# Patient Record
Sex: FEMALE | Race: WHITE | Employment: FULL TIME | URBAN - METROPOLITAN AREA
[De-identification: names, ages, dates, MRNs, and addresses within clinical notes are randomized per-mention and may not be internally consistent; named-entity substitution may affect disease eponyms.]

---

## 2017-01-24 ENCOUNTER — MED REC SCAN ONLY (OUTPATIENT)
Dept: INTERNAL MEDICINE CLINIC | Facility: CLINIC | Age: 32
End: 2017-01-24

## 2017-02-20 ENCOUNTER — PRIOR ORIGINAL RECORDS (OUTPATIENT)
Dept: OTHER | Age: 32
End: 2017-02-20

## 2017-05-03 ENCOUNTER — CHARTING TRANS (OUTPATIENT)
Dept: OTHER | Age: 32
End: 2017-05-03

## 2017-08-13 ENCOUNTER — HOSPITAL ENCOUNTER (OUTPATIENT)
Age: 32
Discharge: HOME | End: 2017-08-13
Payer: COMMERCIAL

## 2017-08-13 VITALS
WEIGHT: 190 LBS | HEART RATE: 93 BPM | DIASTOLIC BLOOD PRESSURE: 68 MMHG | SYSTOLIC BLOOD PRESSURE: 116 MMHG | RESPIRATION RATE: 16 BRPM | TEMPERATURE: 98 F | BODY MASS INDEX: 28 KG/M2 | OXYGEN SATURATION: 100 %

## 2017-08-13 DIAGNOSIS — J01.10 ACUTE FRONTAL SINUSITIS, RECURRENCE NOT SPECIFIED: Primary | ICD-10-CM

## 2017-08-13 DIAGNOSIS — J30.9 ALLERGIC RHINITIS, UNSPECIFIED CHRONICITY, UNSPECIFIED SEASONALITY, UNSPECIFIED TRIGGER: ICD-10-CM

## 2017-08-13 PROCEDURE — 99213 OFFICE O/P EST LOW 20 MIN: CPT

## 2017-08-13 PROCEDURE — 99204 OFFICE O/P NEW MOD 45 MIN: CPT

## 2017-08-13 RX ORDER — AMOXICILLIN AND CLAVULANATE POTASSIUM 875; 125 MG/1; MG/1
1 TABLET, FILM COATED ORAL 2 TIMES DAILY
Qty: 20 TABLET | Refills: 0 | Status: SHIPPED | OUTPATIENT
Start: 2017-08-13 | End: 2017-08-23

## 2017-08-13 NOTE — ED PROVIDER NOTES
Patient Seen in: [id number] [facility]    History   Patient presents with:  Cough/URI    Stated Complaint: Concetta Morales    HPI    28 yo female here with c/o sinus pain/pressure in tandem with post nasal drip and facial pressure and non-prod Smokeless tobacco: Never Used                      Alcohol use: No                The patient's medication list, past medical history and social history elements  as listed in today's nurse's notes are reviewed and agree.    The patient's family history and time. She has normal reflexes. Skin: Skin is warm and dry. Psychiatric: She has a normal mood and affect.  Her behavior is normal. Judgment and thought content normal.           ED Course       =======================================================

## 2017-08-13 NOTE — ED INITIAL ASSESSMENT (HPI)
Sinus pressure for 4 days, family member's have sinus infections,  No cough, +post nasal drip, no fevers.

## 2018-01-09 ENCOUNTER — LAB SERVICES (OUTPATIENT)
Dept: OTHER | Age: 33
End: 2018-01-09

## 2018-01-09 ENCOUNTER — CHARTING TRANS (OUTPATIENT)
Dept: OTHER | Age: 33
End: 2018-01-09

## 2018-01-11 LAB — PATH REPORT: NORMAL

## 2018-01-29 ENCOUNTER — TELEPHONE (OUTPATIENT)
Dept: INTERNAL MEDICINE CLINIC | Facility: CLINIC | Age: 33
End: 2018-01-29

## 2018-01-29 NOTE — TELEPHONE ENCOUNTER
Labs collected 1/17/18 from [facility] CBC, CMP and LIPID  To TB for review,    Pt overdue for [employer] -please help pt schedule

## 2018-01-30 NOTE — TELEPHONE ENCOUNTER
Called pt to let her know she is due for cpe and she will call us back in the summer to sched her cpe

## 2018-05-15 ENCOUNTER — HOSPITAL ENCOUNTER (OUTPATIENT)
Age: 33
Discharge: HOME | End: 2018-05-15
Attending: FAMILY MEDICINE
Payer: COMMERCIAL

## 2018-05-15 VITALS
BODY MASS INDEX: 31 KG/M2 | SYSTOLIC BLOOD PRESSURE: 114 MMHG | OXYGEN SATURATION: 98 % | WEIGHT: 210 LBS | DIASTOLIC BLOOD PRESSURE: 59 MMHG | HEART RATE: 75 BPM | RESPIRATION RATE: 16 BRPM | TEMPERATURE: 98 F

## 2018-05-15 DIAGNOSIS — K52.9 GASTROENTERITIS: Primary | ICD-10-CM

## 2018-05-15 DIAGNOSIS — R53.83 FATIGUE, UNSPECIFIED TYPE: ICD-10-CM

## 2018-05-15 PROCEDURE — 99213 OFFICE O/P EST LOW 20 MIN: CPT

## 2018-05-15 PROCEDURE — 99214 OFFICE O/P EST MOD 30 MIN: CPT

## 2018-05-15 RX ORDER — AZELASTINE HYDROCHLORIDE, FLUTICASONE PROPIONATE 137; 50 UG/1; UG/1
SPRAY, METERED NASAL
COMMUNITY
End: 2020-01-08 | Stop reason: ALTCHOICE

## 2018-05-15 RX ORDER — ONDANSETRON 8 MG/1
8 TABLET, ORALLY DISINTEGRATING ORAL EVERY 12 HOURS PRN
Qty: 10 TABLET | Refills: 0 | Status: SHIPPED | OUTPATIENT
Start: 2018-05-15 | End: 2018-05-25

## 2018-05-15 RX ORDER — LEVOCETIRIZINE DIHYDROCHLORIDE 5 MG/1
5 TABLET, FILM COATED ORAL EVERY EVENING
COMMUNITY
End: 2018-08-20

## 2018-05-15 NOTE — ED PROVIDER NOTES
Patient Seen in: [id number] [facility]    History   No chief complaint on file. Stated Complaint: VOMITING/FATIGUE/HEADACHE    HPI    75-year-old female presents to the immediate care today with chief complaints of nausea and vomiting.   Her VOMITING/FATIGUE/HEADACHE  Other systems are as noted in HPI. Constitutional and vital signs reviewed. All other systems reviewed and negative except as noted above.     Physical Exam   ED Triage Vitals  BP: 114/59 [05/15/18 1211]  Pulse: 75 [05/15/18 directed        Disposition and Plan     Clinical Impression:  Gastroenteritis  (primary encounter diagnosis)  Fatigue, unspecified type    Disposition:  Discharge  5/15/2018 12:31 pm    Follow-up:  MD Ana Luisa Bernardo

## 2018-05-15 NOTE — ED INITIAL ASSESSMENT (HPI)
Patient states she was vomiting Friday into Saturday. Emesis x 4-5. Denies diarrhea. Headache since Saturday. Vomiting resolved and is able to tolerate food and liquids.

## 2018-08-20 PROCEDURE — 88175 CYTOPATH C/V AUTO FLUID REDO: CPT | Performed by: OBSTETRICS & GYNECOLOGY

## 2018-08-20 PROCEDURE — 87624 HPV HI-RISK TYP POOLED RSLT: CPT | Performed by: OBSTETRICS & GYNECOLOGY

## 2019-01-03 ENCOUNTER — MYAURORA ACCOUNT LINK (OUTPATIENT)
Dept: OTHER | Age: 34
End: 2019-01-03

## 2019-01-03 ENCOUNTER — PRIOR ORIGINAL RECORDS (OUTPATIENT)
Dept: OTHER | Age: 34
End: 2019-01-03

## 2019-01-14 ENCOUNTER — PATIENT MESSAGE (OUTPATIENT)
Dept: INTERNAL MEDICINE CLINIC | Facility: CLINIC | Age: 34
End: 2019-01-14

## 2019-01-14 ENCOUNTER — OFFICE VISIT (OUTPATIENT)
Dept: INTERNAL MEDICINE CLINIC | Facility: CLINIC | Age: 34
End: 2019-01-14
Payer: COMMERCIAL

## 2019-01-14 VITALS
TEMPERATURE: 98 F | RESPIRATION RATE: 14 BRPM | DIASTOLIC BLOOD PRESSURE: 74 MMHG | HEIGHT: 69 IN | SYSTOLIC BLOOD PRESSURE: 116 MMHG | HEART RATE: 72 BPM | BODY MASS INDEX: 33.77 KG/M2 | WEIGHT: 228 LBS

## 2019-01-14 DIAGNOSIS — F41.9 ANXIETY: ICD-10-CM

## 2019-01-14 DIAGNOSIS — G44.86 CERVICOGENIC HEADACHE: Primary | ICD-10-CM

## 2019-01-14 PROCEDURE — 99213 OFFICE O/P EST LOW 20 MIN: CPT | Performed by: INTERNAL MEDICINE

## 2019-01-14 RX ORDER — CYCLOBENZAPRINE HCL 5 MG
5 TABLET ORAL NIGHTLY PRN
Qty: 30 TABLET | Refills: 1 | Status: SHIPPED | OUTPATIENT
Start: 2019-01-14 | End: 2019-05-31 | Stop reason: ALTCHOICE

## 2019-01-14 RX ORDER — ESCITALOPRAM OXALATE 20 MG/1
20 TABLET ORAL DAILY
Qty: 90 TABLET | Refills: 1 | Status: SHIPPED | OUTPATIENT
Start: 2019-01-14 | End: 2019-05-31 | Stop reason: ALTCHOICE

## 2019-01-14 NOTE — PROGRESS NOTES
Kyung Morgan is a 26year old female. Patient presents with:  Headache: Pt is having frequent and worsening headaches for the past 6 months. LB-rm 4      HPI:     C/o headaches in the back of her head for 6 months. Heat and nsaids help.   Denies any co depression 6/28/2013   • BACK PAIN    • Bronchitis    • Hirsutism     FACE   • History of blood transfusion     postpartum 6/2013 d/t uterine atony   • Hypercholesterolemia    • Personal history of malignant melanoma of skin 4-27-15    MM - Right Upper Arm types were placed in this encounter.       Meds & Refills for this Visit:  Requested Prescriptions     Signed Prescriptions Disp Refills   • Cyclobenzaprine HCl 5 MG Oral Tab 30 tablet 1     Sig: Take 1 tablet (5 mg total) by mouth nightly as needed for Mus

## 2019-01-15 ENCOUNTER — TELEPHONE (OUTPATIENT)
Dept: INTERNAL MEDICINE CLINIC | Facility: CLINIC | Age: 34
End: 2019-01-15

## 2019-01-15 DIAGNOSIS — Z13.220 SCREENING FOR LIPID DISORDERS: ICD-10-CM

## 2019-01-15 DIAGNOSIS — Z13.228 SCREENING FOR ENDOCRINE, METABOLIC AND IMMUNITY DISORDER: ICD-10-CM

## 2019-01-15 DIAGNOSIS — Z00.00 ROUTINE GENERAL MEDICAL EXAMINATION AT A HEALTH CARE FACILITY: Primary | ICD-10-CM

## 2019-01-15 DIAGNOSIS — Z13.0 SCREENING FOR DISORDER OF BLOOD AND BLOOD-FORMING ORGANS: ICD-10-CM

## 2019-01-15 DIAGNOSIS — Z13.0 SCREENING FOR ENDOCRINE, METABOLIC AND IMMUNITY DISORDER: ICD-10-CM

## 2019-01-15 DIAGNOSIS — Z13.29 SCREENING FOR ENDOCRINE, METABOLIC AND IMMUNITY DISORDER: ICD-10-CM

## 2019-01-15 DIAGNOSIS — Z13.29 SCREENING FOR THYROID DISORDER: ICD-10-CM

## 2019-01-15 NOTE — TELEPHONE ENCOUNTER
Future Appointments   Date Time Provider Beckie Singh   2/11/2019  3:00 PM Cat Woods [facility] [address]   7/1/2019  9:00 AM Jannette Felder MD [facility]     Orders to quest-Pt aware to fast-no call back required

## 2019-01-15 NOTE — TELEPHONE ENCOUNTER
Matthew Duarte MD at 1/15/2019 12:42 PM     Status: Signed      I cannot put it in as insurance would need documentation from me about her back, her back exam etc

## 2019-01-15 NOTE — TELEPHONE ENCOUNTER
January 14, 2019   Keron Allen   to Enoch Sylvester MD           6:25 PM   Hi Dr. Sharif Julio-   I know I had mentioned that I made an appointment with Dr. Sanket Griggs regarding continued pain/ discomfort in my lower back.  I meant to ask if you were able to pu

## 2019-01-15 NOTE — TELEPHONE ENCOUNTER
Patient notified TB would not be able to order, insurance would need documentation on her back along with an exam.  Pt verbalizes understanding.

## 2019-01-22 ENCOUNTER — TELEPHONE (OUTPATIENT)
Dept: INTERNAL MEDICINE CLINIC | Facility: CLINIC | Age: 34
End: 2019-01-22

## 2019-02-02 ENCOUNTER — TELEPHONE (OUTPATIENT)
Dept: INTERNAL MEDICINE CLINIC | Facility: CLINIC | Age: 34
End: 2019-02-02

## 2019-02-02 NOTE — TELEPHONE ENCOUNTER
Wellness labs have been given to AS to review as due to elevated ALT- 171 and AST 79.  Per AS ok to wait placed in TB bin to further review

## 2019-02-04 NOTE — TELEPHONE ENCOUNTER
[phone]  Lm for pt (Kirsten Mendez per HIPAA) to inform, per TB, elevated LFT - AST 79 and   LDL elevated at 191  Advised will need 30 min f/u for elevated LFTs, high cholesterol and headaches within 2-3 weeks ideally.  To call back at the office to schedul

## 2019-02-04 NOTE — TELEPHONE ENCOUNTER
Elevated LFT - AST 79 and   LDL elevated at 191  Needs 30 min f/u for elevated LFTs, high cholesterol and headaches.   Within 2-3 weeks ideally

## 2019-02-09 ENCOUNTER — MED REC SCAN ONLY (OUTPATIENT)
Dept: INTERNAL MEDICINE CLINIC | Facility: CLINIC | Age: 34
End: 2019-02-09

## 2019-02-21 RX ORDER — AZELASTINE HYDROCHLORIDE AND FLUTICASONE PROPIONATE 137; 50 UG/1; UG/1
SPRAY, METERED NASAL
COMMUNITY

## 2019-02-21 RX ORDER — VILAZODONE HYDROCHLORIDE 40 MG/1
TABLET ORAL
COMMUNITY

## 2019-02-28 ENCOUNTER — HOSPITAL ENCOUNTER (OUTPATIENT)
Dept: ULTRASOUND IMAGING | Age: 34
Discharge: HOME | End: 2019-02-28
Attending: INTERNAL MEDICINE
Payer: COMMERCIAL

## 2019-02-28 VITALS
SYSTOLIC BLOOD PRESSURE: 118 MMHG | WEIGHT: 225 LBS | BODY MASS INDEX: 33.33 KG/M2 | HEART RATE: 80 BPM | RESPIRATION RATE: 16 BRPM | HEIGHT: 69 IN | DIASTOLIC BLOOD PRESSURE: 70 MMHG

## 2019-02-28 DIAGNOSIS — R79.89 ELEVATED LFTS: ICD-10-CM

## 2019-02-28 DIAGNOSIS — R79.89 ELEVATED FERRITIN: ICD-10-CM

## 2019-02-28 PROCEDURE — 76700 US EXAM ABDOM COMPLETE: CPT | Performed by: INTERNAL MEDICINE

## 2019-03-01 VITALS
HEIGHT: 69 IN | HEART RATE: 64 BPM | RESPIRATION RATE: 16 BRPM | DIASTOLIC BLOOD PRESSURE: 62 MMHG | WEIGHT: 175 LBS | BODY MASS INDEX: 25.92 KG/M2 | SYSTOLIC BLOOD PRESSURE: 102 MMHG

## 2019-04-04 ENCOUNTER — TELEPHONE (OUTPATIENT)
Dept: INTERNAL MEDICINE CLINIC | Facility: CLINIC | Age: 34
End: 2019-04-04

## 2019-04-04 NOTE — TELEPHONE ENCOUNTER
Fax received from [facility] physical therapy with Discharge Summary requesting signature , paperwork placed in TB's bin to be completed

## 2019-05-31 ENCOUNTER — OFFICE VISIT (OUTPATIENT)
Dept: INTERNAL MEDICINE CLINIC | Facility: CLINIC | Age: 34
End: 2019-05-31
Payer: COMMERCIAL

## 2019-05-31 VITALS
RESPIRATION RATE: 16 BRPM | HEART RATE: 92 BPM | TEMPERATURE: 99 F | WEIGHT: 231 LBS | SYSTOLIC BLOOD PRESSURE: 96 MMHG | BODY MASS INDEX: 34 KG/M2 | DIASTOLIC BLOOD PRESSURE: 60 MMHG

## 2019-05-31 DIAGNOSIS — R05.9 COUGH: ICD-10-CM

## 2019-05-31 DIAGNOSIS — J06.9 URI, ACUTE: Primary | ICD-10-CM

## 2019-05-31 PROCEDURE — 99213 OFFICE O/P EST LOW 20 MIN: CPT | Performed by: INTERNAL MEDICINE

## 2019-05-31 RX ORDER — CODEINE PHOSPHATE AND GUAIFENESIN 10; 100 MG/5ML; MG/5ML
10 SOLUTION ORAL NIGHTLY PRN
Qty: 180 ML | Refills: 0 | Status: SHIPPED | OUTPATIENT
Start: 2019-05-31 | End: 2019-06-10

## 2019-05-31 RX ORDER — ALBUTEROL SULFATE 90 UG/1
2 AEROSOL, METERED RESPIRATORY (INHALATION) EVERY 4 HOURS PRN
Qty: 1 INHALER | Refills: 1 | Status: SHIPPED | OUTPATIENT
Start: 2019-05-31 | End: 2020-01-08 | Stop reason: ALTCHOICE

## 2019-05-31 RX ORDER — AMOXICILLIN AND CLAVULANATE POTASSIUM 875; 125 MG/1; MG/1
1 TABLET, FILM COATED ORAL 2 TIMES DAILY
Qty: 20 TABLET | Refills: 0 | Status: SHIPPED | OUTPATIENT
Start: 2019-05-31 | End: 2019-06-10

## 2019-05-31 NOTE — PROGRESS NOTES
Alyx Real is a 37year old female. Patient presents with:  Cough: SN Rm 3; x 1 week, cough, sinus drainage, denied fevers      HPI:     C/o 1+ week of cough, congestion, sinus drainage, fatigue.  +sick contact (co worker with pneumonia)  Unable to s malignant melanoma of skin 4-27-15    MM - Right Upper Arm - WLE done by FEDERICO Grimes MD.   • Post partum depression    • Postpartum depression, postpartum condition 6/28/2013   • Sinusitis       Social History:  Social History    Tobacco Use      Smoking st Prescriptions Disp Refills   • Amoxicillin-Pot Clavulanate 875-125 MG Oral Tab 20 tablet 0     Sig: Take 1 tablet by mouth 2 (two) times daily for 10 days.    • guaiFENesin-codeine (CHERATUSSIN AC) 100-10 MG/5ML Oral Solution 180 mL 0     Sig: Take 10 mL by

## 2019-06-11 ENCOUNTER — APPOINTMENT (OUTPATIENT)
Dept: DERMATOLOGY | Age: 34
End: 2019-06-11

## 2019-06-18 ENCOUNTER — OFFICE VISIT (OUTPATIENT)
Dept: DERMATOLOGY | Age: 34
End: 2019-06-18

## 2019-06-18 DIAGNOSIS — B35.3 TINEA PEDIS, UNSPECIFIED LATERALITY: ICD-10-CM

## 2019-06-18 DIAGNOSIS — Z85.820 HISTORY OF MALIGNANT MELANOMA OF SKIN: ICD-10-CM

## 2019-06-18 DIAGNOSIS — D23.9 BENIGN NEOPLASM OF SKIN, UNSPECIFIED LOCATION: Primary | ICD-10-CM

## 2019-06-18 DIAGNOSIS — Z12.83 SCREENING EXAM FOR SKIN CANCER: ICD-10-CM

## 2019-06-18 PROCEDURE — 99214 OFFICE O/P EST MOD 30 MIN: CPT | Performed by: DERMATOLOGY

## 2019-06-18 RX ORDER — CLOTRIMAZOLE AND BETAMETHASONE DIPROPIONATE 10; .64 MG/G; MG/G
CREAM TOPICAL
Qty: 45 G | Refills: 1 | Status: SHIPPED | OUTPATIENT
Start: 2019-06-18 | End: 2020-06-18 | Stop reason: SDUPTHER

## 2020-01-08 ENCOUNTER — OFFICE VISIT (OUTPATIENT)
Dept: INTERNAL MEDICINE CLINIC | Facility: CLINIC | Age: 35
End: 2020-01-08
Payer: COMMERCIAL

## 2020-01-08 VITALS
RESPIRATION RATE: 18 BRPM | SYSTOLIC BLOOD PRESSURE: 116 MMHG | TEMPERATURE: 97 F | HEIGHT: 69.5 IN | DIASTOLIC BLOOD PRESSURE: 72 MMHG | HEART RATE: 77 BPM | WEIGHT: 242 LBS | OXYGEN SATURATION: 99 % | BODY MASS INDEX: 35.04 KG/M2

## 2020-01-08 DIAGNOSIS — J06.9 URI, ACUTE: Primary | ICD-10-CM

## 2020-01-08 PROCEDURE — 99213 OFFICE O/P EST LOW 20 MIN: CPT | Performed by: PHYSICIAN ASSISTANT

## 2020-01-08 RX ORDER — AZITHROMYCIN 250 MG/1
TABLET, FILM COATED ORAL
Qty: 6 TABLET | Refills: 0 | Status: SHIPPED | OUTPATIENT
Start: 2020-01-08 | End: 2020-01-16 | Stop reason: ALTCHOICE

## 2020-01-08 RX ORDER — ALBUTEROL SULFATE 90 UG/1
2 AEROSOL, METERED RESPIRATORY (INHALATION) EVERY 4 HOURS PRN
Qty: 1 INHALER | Refills: 0 | Status: SHIPPED | OUTPATIENT
Start: 2020-01-08 | End: 2020-06-10 | Stop reason: ALTCHOICE

## 2020-01-08 NOTE — PROGRESS NOTES
HPI:   Anand Méndez is a 37year old female who presents for upper respiratory symptoms for  7  days (7 days of cough but more like a month of nasal congestion).     Patient reports sore throat, congestion, cough with yellow colored sputum, cough is rick Maternal Grandmother         Colon   • Heart Disease Paternal Grandfather    • Cancer Paternal Grandmother         breast cancer    • Breast Cancer Paternal Grandmother 39        dx age 39      Social History    Tobacco Use      Smoking status: Never Smoke physical.  There are no Patient Instructions on file for this visit. All questions were answered and the patient understands the plan.

## 2020-01-16 ENCOUNTER — OFFICE VISIT (OUTPATIENT)
Dept: INTERNAL MEDICINE CLINIC | Facility: CLINIC | Age: 35
End: 2020-01-16
Payer: COMMERCIAL

## 2020-01-16 VITALS
WEIGHT: 240 LBS | SYSTOLIC BLOOD PRESSURE: 100 MMHG | BODY MASS INDEX: 34.75 KG/M2 | HEIGHT: 69.5 IN | TEMPERATURE: 98 F | HEART RATE: 96 BPM | RESPIRATION RATE: 16 BRPM | OXYGEN SATURATION: 97 % | DIASTOLIC BLOOD PRESSURE: 76 MMHG

## 2020-01-16 DIAGNOSIS — J06.9 URI, ACUTE: Primary | ICD-10-CM

## 2020-01-16 PROCEDURE — 99213 OFFICE O/P EST LOW 20 MIN: CPT | Performed by: INTERNAL MEDICINE

## 2020-01-16 RX ORDER — METHYLPREDNISOLONE 4 MG/1
TABLET ORAL
Qty: 1 KIT | Refills: 0 | Status: SHIPPED | OUTPATIENT
Start: 2020-01-16 | End: 2020-02-06 | Stop reason: ALTCHOICE

## 2020-01-16 RX ORDER — FLUTICASONE PROPIONATE 50 MCG
1 SPRAY, SUSPENSION (ML) NASAL 2 TIMES DAILY
COMMUNITY
Start: 2020-01-08 | End: 2020-06-10 | Stop reason: ALTCHOICE

## 2020-01-16 RX ORDER — AMOXICILLIN AND CLAVULANATE POTASSIUM 875; 125 MG/1; MG/1
1 TABLET, FILM COATED ORAL 2 TIMES DAILY
Qty: 20 TABLET | Refills: 0 | Status: SHIPPED | OUTPATIENT
Start: 2020-01-16 | End: 2020-01-26

## 2020-01-16 NOTE — PROGRESS NOTES
Aislinn Allen  4/26/1985    Patient presents with:  Cough: since 1/8, worsened cough, denies SOB/wheezing, productive, nasal congestion, denies fevers/chills      SUBJECTIVE   Aislinn Allen is a 37year old female who presents with a cough.     The pa Post partum depression    • Postpartum depression, postpartum condition 6/28/2013   • Sinusitis       Patient Active Problem List:     Displacement of lumbar intervertebral disc without myelopathy     DDD (degenerative disc disease), lumbar     Lumbago syn gallops. Pulmonary/Chest: Effort normal and breath sounds normal. No respiratory distress. No rhonchi, crackles, or wheezes. Abdominal: Soft. Bowel sounds are normal. Non tender, no masses, no organomegaly or hernias.   Musculoskeletal: No edema  Lympha

## 2020-01-29 LAB
AMB EXT CHOL/HDL RATIO: 8.1
AMB EXT CHOLESTEROL, TOTAL: 292 MG/DL
AMB EXT CREATININE: 0.76 MG/DL
AMB EXT GLUCOSE: 111 MG/DL
AMB EXT HDL CHOLESTEROL: 36 MG/DL
AMB EXT HEMATOCRIT: 39.6
AMB EXT HEMOGLOBIN: 13.1
AMB EXT HGBA1C: 6 %
AMB EXT LDL CHOLESTEROL, DIRECT: 200 MG/DL
AMB EXT MCV: 94
AMB EXT PLATELETS: 319
AMB EXT TRIGLYCERIDES: 282 MG/DL
AMB EXT WBC: 6.8 X10(3)UL

## 2020-01-31 ENCOUNTER — TELEPHONE (OUTPATIENT)
Dept: INTERNAL MEDICINE CLINIC | Facility: CLINIC | Age: 35
End: 2020-01-31

## 2020-01-31 NOTE — TELEPHONE ENCOUNTER
[phone]   for pt ([id number] Chrissy Vazquez per HIPAA) to inform, per TB, will need OV to further discuss options regarding high cholesterol panel and BG. To call back at the office to schedule or if any further questions. Original copy of results to scan.   Copy in upcom

## 2020-01-31 NOTE — TELEPHONE ENCOUNTER
Very high cholesterol: total 292, , HDL 36 and !   BG high at 111 with hgba1c of 6  Needs OV to discuss options

## 2020-02-06 ENCOUNTER — OFFICE VISIT (OUTPATIENT)
Dept: INTERNAL MEDICINE CLINIC | Facility: CLINIC | Age: 35
End: 2020-02-06
Payer: COMMERCIAL

## 2020-02-06 ENCOUNTER — OFFICE VISIT (OUTPATIENT)
Dept: DERMATOLOGY | Age: 35
End: 2020-02-06

## 2020-02-06 VITALS
DIASTOLIC BLOOD PRESSURE: 68 MMHG | HEART RATE: 76 BPM | HEIGHT: 69.29 IN | TEMPERATURE: 99 F | WEIGHT: 241 LBS | RESPIRATION RATE: 16 BRPM | SYSTOLIC BLOOD PRESSURE: 98 MMHG | BODY MASS INDEX: 35.29 KG/M2

## 2020-02-06 DIAGNOSIS — F41.9 ANXIETY: ICD-10-CM

## 2020-02-06 DIAGNOSIS — M51.36 DDD (DEGENERATIVE DISC DISEASE), LUMBAR: ICD-10-CM

## 2020-02-06 DIAGNOSIS — L57.0 ACTINIC KERATOSES: Primary | ICD-10-CM

## 2020-02-06 DIAGNOSIS — E66.9 CLASS 2 OBESITY: ICD-10-CM

## 2020-02-06 DIAGNOSIS — Z00.00 ANNUAL PHYSICAL EXAM: Primary | ICD-10-CM

## 2020-02-06 PROCEDURE — 99395 PREV VISIT EST AGE 18-39: CPT | Performed by: INTERNAL MEDICINE

## 2020-02-06 PROCEDURE — 99213 OFFICE O/P EST LOW 20 MIN: CPT | Performed by: DERMATOLOGY

## 2020-02-06 PROCEDURE — 17000 DESTRUCT PREMALG LESION: CPT | Performed by: DERMATOLOGY

## 2020-02-06 RX ORDER — ESCITALOPRAM OXALATE 10 MG/1
10 TABLET ORAL NIGHTLY
Qty: 90 TABLET | Refills: 3 | Status: SHIPPED | OUTPATIENT
Start: 2020-02-06 | End: 2021-01-18

## 2020-02-06 NOTE — PROGRESS NOTES
Sky Allen  4/26/1985    Patient presents with:  Wellness Visit      HPI:   Minna Pope is a 37year old female who presents for an annual physical examination.     The patient has been in her usual state of health and denies any acute complaints Personal history of malignant melanoma of skin     Malignant melanoma ([facility] [address]     Mild hyperlipidemia     Anxiety     Past Surgical History:   Procedure Laterality Date   • BACK SURGERY  5/26/10    lumbar fusion (L4-L5 XLIF)   • BACK SURGERY  5/26/10    lumb Eva Dorado is a 37year old female who presents for an annual physical examination. Screening and prevention:  Screening for cervical cancer: UTD. Follows with gynecology service.   Influenza immunization: 9/2019  Tetanus immunization: 2013    An

## 2020-04-01 ENCOUNTER — E-VISIT (OUTPATIENT)
Dept: FAMILY MEDICINE CLINIC | Facility: CLINIC | Age: 35
End: 2020-04-01

## 2020-04-01 DIAGNOSIS — J06.9 VIRAL URI: Primary | ICD-10-CM

## 2020-04-02 NOTE — PROGRESS NOTES
Viral sx vs allergies for 6 days. Supportive care advised, if sx continue the next 3-5 days patient advised to contact pcp office. She will not be charged for e-visit.

## 2020-04-04 ENCOUNTER — E-VISIT (OUTPATIENT)
Dept: FAMILY MEDICINE CLINIC | Facility: CLINIC | Age: 35
End: 2020-04-04

## 2020-04-04 DIAGNOSIS — R39.9 URINARY SYMPTOM OR SIGN: Primary | ICD-10-CM

## 2020-04-05 RX ORDER — NITROFURANTOIN 25; 75 MG/1; MG/1
100 CAPSULE ORAL 2 TIMES DAILY
Qty: 14 CAPSULE | Refills: 0 | Status: SHIPPED | OUTPATIENT
Start: 2020-04-05 | End: 2020-04-12

## 2020-04-05 NOTE — PROGRESS NOTES
Riya Young is a 37year old female. HPI:   See answers to questions above.      Current Outpatient Medications   Medication Sig Dispense Refill   • Nitrofurantoin Monohyd Macro (MACROBID) 100 MG Oral Cap Take 1 capsule (100 mg total) by mouth 2 (two) Grandmother         breast cancer    • Breast Cancer Paternal Grandmother 39        dx age 39      Social History:  Social History    Tobacco Use      Smoking status: Never Smoker      Smokeless tobacco: Never Used    Alcohol use: No      Alcohol/week: 0.0

## 2020-06-02 ENCOUNTER — TELEPHONE (OUTPATIENT)
Dept: INTERNAL MEDICINE CLINIC | Facility: CLINIC | Age: 35
End: 2020-06-02

## 2020-06-10 ENCOUNTER — OFFICE VISIT (OUTPATIENT)
Dept: INTERNAL MEDICINE CLINIC | Facility: CLINIC | Age: 35
End: 2020-06-10
Payer: COMMERCIAL

## 2020-06-10 VITALS
HEIGHT: 69.09 IN | WEIGHT: 230 LBS | HEART RATE: 76 BPM | BODY MASS INDEX: 34.07 KG/M2 | RESPIRATION RATE: 16 BRPM | TEMPERATURE: 98 F | SYSTOLIC BLOOD PRESSURE: 96 MMHG | DIASTOLIC BLOOD PRESSURE: 66 MMHG

## 2020-06-10 DIAGNOSIS — Z01.818 PREOP EXAMINATION: Primary | ICD-10-CM

## 2020-06-10 DIAGNOSIS — M72.2 PLANTAR FASCIITIS, RIGHT: ICD-10-CM

## 2020-06-10 PROCEDURE — 99243 OFF/OP CNSLTJ NEW/EST LOW 30: CPT | Performed by: INTERNAL MEDICINE

## 2020-06-10 NOTE — PROGRESS NOTES
[facility]  PRE OP RISK ASSESSMENT    REASON FOR CONSULT: Pre-op risk assessment for surgical procedure: Right plantar fasciotomy planned for: 6/26/2020 with twilight anesthesia.     REQUESTING PHYSICIAN: Dr. Amada Hammans: Patient pr needed. 1 each 0        Allergies:     Allergies As of Date: 06/10/2020  Allergen                          Noted       Reaction  SULFA DRUGS CROSS REACTORS        12/21/2012  HIVES  SULFA ANTIBIOTICS                 04/27/2010  HIVES  ZOLOFT [SERTRALINE HCL Stress Concern: Not Asked        Weight Concern: Not Asked        Special Diet: Not Asked        Back Care: Not Asked        Exercise: Not Asked        Bike Helmet: Yes        Seat Belt: Not Asked        Self-Exams: Not Asked    Social History Narrative auscultation  CARDIO: RRR without murmur  GI: good BS's,no masses, HSM or tenderness    LABS:  None requested or pending. ASSESSMENT AND PLAN:    1. Preop examination  The patient is at acceptable risk of complications for the planned procedure.   She is

## 2020-06-18 ENCOUNTER — TELEPHONE (OUTPATIENT)
Dept: INTERNAL MEDICINE CLINIC | Facility: CLINIC | Age: 35
End: 2020-06-18

## 2020-06-18 ENCOUNTER — OFFICE VISIT (OUTPATIENT)
Dept: DERMATOLOGY | Age: 35
End: 2020-06-18

## 2020-06-18 DIAGNOSIS — B35.3 TINEA PEDIS, UNSPECIFIED LATERALITY: ICD-10-CM

## 2020-06-18 DIAGNOSIS — D23.9 BENIGN NEOPLASM OF SKIN, UNSPECIFIED LOCATION: Primary | ICD-10-CM

## 2020-06-18 DIAGNOSIS — Z12.83 SCREENING EXAM FOR SKIN CANCER: ICD-10-CM

## 2020-06-18 PROCEDURE — 99214 OFFICE O/P EST MOD 30 MIN: CPT | Performed by: DERMATOLOGY

## 2020-06-18 RX ORDER — CLOTRIMAZOLE AND BETAMETHASONE DIPROPIONATE 10; .64 MG/G; MG/G
CREAM TOPICAL
Qty: 45 G | Refills: 1 | Status: SHIPPED | OUTPATIENT
Start: 2020-06-18 | End: 2021-09-27 | Stop reason: SDUPTHER

## 2020-06-18 RX ORDER — ESCITALOPRAM OXALATE 10 MG/1
10 TABLET ORAL
COMMUNITY
Start: 2015-05-04

## 2020-06-26 ENCOUNTER — TELEPHONE (OUTPATIENT)
Dept: INTERNAL MEDICINE CLINIC | Facility: CLINIC | Age: 35
End: 2020-06-26

## 2020-06-26 NOTE — TELEPHONE ENCOUNTER
Covid 19 nasal swab test results received dated 6/23/20 abstracted and placed in TB's bin to be reviewed

## 2020-07-06 ENCOUNTER — MED REC SCAN ONLY (OUTPATIENT)
Dept: INTERNAL MEDICINE CLINIC | Facility: CLINIC | Age: 35
End: 2020-07-06

## 2020-09-17 ENCOUNTER — TELEPHONE (OUTPATIENT)
Dept: INTERNAL MEDICINE CLINIC | Facility: CLINIC | Age: 35
End: 2020-09-17

## 2020-09-17 NOTE — TELEPHONE ENCOUNTER
Pt has surgery scheduled with Dr. Юлия Wagner for 10/16/20    Pre-op scheduled for   Future Appointments   Date Time Provider Beckie Singh   10/2/2020  8:40 AM Jose Lee MD [facility]      Faxed our form to [phone] and awaiting

## 2020-10-02 ENCOUNTER — OFFICE VISIT (OUTPATIENT)
Dept: INTERNAL MEDICINE CLINIC | Facility: CLINIC | Age: 35
End: 2020-10-02
Payer: COMMERCIAL

## 2020-10-02 VITALS
SYSTOLIC BLOOD PRESSURE: 124 MMHG | BODY MASS INDEX: 35.46 KG/M2 | TEMPERATURE: 98 F | DIASTOLIC BLOOD PRESSURE: 82 MMHG | HEART RATE: 98 BPM | RESPIRATION RATE: 16 BRPM | OXYGEN SATURATION: 99 % | HEIGHT: 68 IN | WEIGHT: 234 LBS

## 2020-10-02 DIAGNOSIS — M72.2 PLANTAR FASCIITIS OF LEFT FOOT: ICD-10-CM

## 2020-10-02 DIAGNOSIS — G47.33 OSA (OBSTRUCTIVE SLEEP APNEA): ICD-10-CM

## 2020-10-02 DIAGNOSIS — Z01.818 PREOP EXAMINATION: Primary | ICD-10-CM

## 2020-10-02 PROCEDURE — 99244 OFF/OP CNSLTJ NEW/EST MOD 40: CPT | Performed by: INTERNAL MEDICINE

## 2020-10-02 PROCEDURE — 3074F SYST BP LT 130 MM HG: CPT | Performed by: INTERNAL MEDICINE

## 2020-10-02 PROCEDURE — 3008F BODY MASS INDEX DOCD: CPT | Performed by: INTERNAL MEDICINE

## 2020-10-02 PROCEDURE — 3079F DIAST BP 80-89 MM HG: CPT | Performed by: INTERNAL MEDICINE

## 2020-10-02 PROCEDURE — 93000 ELECTROCARDIOGRAM COMPLETE: CPT | Performed by: INTERNAL MEDICINE

## 2020-10-02 NOTE — PROGRESS NOTES
[facility]  PRE OP RISK ASSESSMENT      CHIEF COMPLAINT: Patient presents with:  Pre-Op Exam: ZUHAIR rm 7 Pre-op Endoscopic Left Plantar Facioectomy 10/16/20    REASON FOR CONSULT: Pre-op risk assessment for surgical procedure: Left plantar fasciotom (TOPAMAX) 25 MG Oral Tab Take 1 tablet (25 mg total) by mouth 2 (two) times daily. 60 tablet 5   • Fluticasone Propionate 50 MCG/ACT Nasal Suspension 2 sprays by Each Nare route daily.  (Patient taking differently: 2 sprays by Each Nare route 2 (two) times connections        Talks on phone: Not on file        Gets together: Not on file        Attends [employer] service: Not on file        Active member of club or organization: Not on file        Attends meetings of clubs or organizations: Not on file        R DENTAL APPLIANCES: No  URI, COUGH, CP, FEVER: No  CERVICAL SPINE RESTRICTION: No. No known rheumatoid arthritis.      PHYSICAL EXAM:  /82   Pulse 98   Temp 97.9 °F (36.6 °C)   Resp 16   Ht 68\"   Wt 234 lb (106.1 kg)   LMP 05/24/2020 (Exact Date)   Sp

## 2021-01-18 RX ORDER — ESCITALOPRAM OXALATE 10 MG/1
TABLET ORAL
Qty: 90 TABLET | Refills: 0 | Status: SHIPPED | OUTPATIENT
Start: 2021-01-18 | End: 2021-04-02

## 2021-01-18 NOTE — TELEPHONE ENCOUNTER
Last VISIT 10/02/20    Last REFILL 02/06/20 qty 90 w/3 refills    Last LABS 06/16/20 Multiple labs done     No Future Appointments      Per PROTOCOL? Not on protocol      Please Approve or Deny.

## 2021-02-21 ENCOUNTER — E-VISIT (OUTPATIENT)
Dept: TELEHEALTH | Age: 36
End: 2021-02-21

## 2021-02-21 DIAGNOSIS — H00.019 HORDEOLUM EXTERNUM, UNSPECIFIED LATERALITY: Primary | ICD-10-CM

## 2021-02-21 PROCEDURE — 99421 OL DIG E/M SVC 5-10 MIN: CPT | Performed by: NURSE PRACTITIONER

## 2021-02-21 RX ORDER — ERYTHROMYCIN 5 MG/G
1 OINTMENT OPHTHALMIC EVERY 6 HOURS
Qty: 1 TUBE | Refills: 0 | Status: SHIPPED | OUTPATIENT
Start: 2021-02-21 | End: 2021-04-02

## 2021-02-21 NOTE — PATIENT INSTRUCTIONS
Sty (or Stye)  A sty is when the oil gland of the eyelid becomes inflamed. It may develop into an infection with a small pocket of pus (an abscess). This can cause pain, redness, and swelling.  In early stages, a sty is treated with antibiotic cream, eye © 8069-4871 [employer] [address]. All rights reserved. This information is not intended as a substitute for professional medical care. Always follow your healthcare professional's instructions.

## 2021-02-21 NOTE — PROGRESS NOTES
Patient requested an E-Visit. After reviewing history, symptoms and issuing a prescription, 8 minutes of time was accrued. Please see E-Visit for further information.

## 2021-04-01 ENCOUNTER — TELEPHONE (OUTPATIENT)
Dept: INTERNAL MEDICINE CLINIC | Facility: CLINIC | Age: 36
End: 2021-04-01

## 2021-04-01 NOTE — TELEPHONE ENCOUNTER
Pt scheduled through [facility]. I did send [facility] that these concerns need to be a separate appt from the CPE but should headaches and anxiety be triaged?     Appointment For: Zeferino Hyde ([id number])   Visit Type: [address] [place], [place] [address]y 6 ([id number])      4/2/2021

## 2021-04-01 NOTE — TELEPHONE ENCOUNTER
Okay to come in. Pt reports headaches ongoing and only during intercourse. Started 6 mos ago. Has happened 4 times. No current HA.

## 2021-04-02 ENCOUNTER — OFFICE VISIT (OUTPATIENT)
Dept: INTERNAL MEDICINE CLINIC | Facility: CLINIC | Age: 36
End: 2021-04-02
Payer: COMMERCIAL

## 2021-04-02 VITALS
HEIGHT: 68 IN | WEIGHT: 258 LBS | TEMPERATURE: 98 F | BODY MASS INDEX: 39.1 KG/M2 | RESPIRATION RATE: 16 BRPM | SYSTOLIC BLOOD PRESSURE: 114 MMHG | DIASTOLIC BLOOD PRESSURE: 66 MMHG | HEART RATE: 86 BPM

## 2021-04-02 DIAGNOSIS — E78.00 HIGH CHOLESTEROL: ICD-10-CM

## 2021-04-02 DIAGNOSIS — Z13.21 SCREENING FOR ENDOCRINE, NUTRITIONAL, METABOLIC AND IMMUNITY DISORDER: ICD-10-CM

## 2021-04-02 DIAGNOSIS — M54.2 POSTERIOR NECK PAIN: ICD-10-CM

## 2021-04-02 DIAGNOSIS — Z13.220 SCREENING FOR LIPID DISORDERS: ICD-10-CM

## 2021-04-02 DIAGNOSIS — Z13.29 SCREENING FOR ENDOCRINE, NUTRITIONAL, METABOLIC AND IMMUNITY DISORDER: ICD-10-CM

## 2021-04-02 DIAGNOSIS — F41.9 ANXIETY: ICD-10-CM

## 2021-04-02 DIAGNOSIS — Z13.0 SCREENING FOR DISORDER OF BLOOD AND BLOOD-FORMING ORGANS: ICD-10-CM

## 2021-04-02 DIAGNOSIS — Z13.29 SCREENING FOR THYROID DISORDER: ICD-10-CM

## 2021-04-02 DIAGNOSIS — Z13.0 SCREENING FOR ENDOCRINE, NUTRITIONAL, METABOLIC AND IMMUNITY DISORDER: ICD-10-CM

## 2021-04-02 DIAGNOSIS — Z13.228 SCREENING FOR ENDOCRINE, NUTRITIONAL, METABOLIC AND IMMUNITY DISORDER: ICD-10-CM

## 2021-04-02 DIAGNOSIS — G44.209 TENSION HEADACHE: Primary | ICD-10-CM

## 2021-04-02 PROCEDURE — 3008F BODY MASS INDEX DOCD: CPT | Performed by: INTERNAL MEDICINE

## 2021-04-02 PROCEDURE — 3074F SYST BP LT 130 MM HG: CPT | Performed by: INTERNAL MEDICINE

## 2021-04-02 PROCEDURE — 99214 OFFICE O/P EST MOD 30 MIN: CPT | Performed by: INTERNAL MEDICINE

## 2021-04-02 PROCEDURE — 3078F DIAST BP <80 MM HG: CPT | Performed by: INTERNAL MEDICINE

## 2021-04-02 RX ORDER — ESCITALOPRAM OXALATE 10 MG/1
10 TABLET ORAL NIGHTLY
Qty: 90 TABLET | Refills: 1 | Status: SHIPPED | OUTPATIENT
Start: 2021-04-02 | End: 2021-10-12

## 2021-04-02 RX ORDER — NAPROXEN 500 MG/1
500 TABLET ORAL 2 TIMES DAILY PRN
Qty: 30 TABLET | Refills: 0 | Status: SHIPPED | OUTPATIENT
Start: 2021-04-02 | End: 2021-04-29

## 2021-04-02 NOTE — PROGRESS NOTES
Adolfo Engle is a 38year old female.   Patient presents with:  Headache: rm 4 DO: pt c/o headaches, happening during sexual intercourse   Medication Request: requesting to have refills on rosuvastatin and escitalopram       HPI:     C/o HA during sexua (20 mg total) by mouth nightly. 90 tablet 3   • Levocetirizine Dihydrochloride 5 MG Oral Tab Take 5 mg by mouth every evening. • AUVI-Q 0.3 MG/0.3ML Injection Solution Auto-injector Inject 0.3 mL (1 each total) as directed as needed.  1 each 0      Past lesions  HEENT: atraumatic, normocephalic  NECK: supple,no adenopathy, TTP posteriorly  LUNGS: normal rate without respiratory distress, lungs clear to auscultation  CARDIO: RRR nl S1 S2  GI: normal bowel sounds, soft, NT/ND  EXTREMITIES: no cyanosis, club

## 2021-04-29 ENCOUNTER — APPOINTMENT (OUTPATIENT)
Dept: GENERAL RADIOLOGY | Age: 36
End: 2021-04-29
Attending: STUDENT IN AN ORGANIZED HEALTH CARE EDUCATION/TRAINING PROGRAM
Payer: COMMERCIAL

## 2021-04-29 ENCOUNTER — HOSPITAL ENCOUNTER (EMERGENCY)
Age: 36
Discharge: HOME | End: 2021-04-29
Attending: STUDENT IN AN ORGANIZED HEALTH CARE EDUCATION/TRAINING PROGRAM
Payer: COMMERCIAL

## 2021-04-29 VITALS
TEMPERATURE: 99 F | OXYGEN SATURATION: 97 % | HEIGHT: 69 IN | HEART RATE: 71 BPM | WEIGHT: 250 LBS | DIASTOLIC BLOOD PRESSURE: 76 MMHG | RESPIRATION RATE: 18 BRPM | BODY MASS INDEX: 37.03 KG/M2 | SYSTOLIC BLOOD PRESSURE: 122 MMHG

## 2021-04-29 DIAGNOSIS — R07.9 ACUTE CHEST PAIN: Primary | ICD-10-CM

## 2021-04-29 DIAGNOSIS — R74.01 TRANSAMINITIS: ICD-10-CM

## 2021-04-29 PROCEDURE — 80053 COMPREHEN METABOLIC PANEL: CPT | Performed by: STUDENT IN AN ORGANIZED HEALTH CARE EDUCATION/TRAINING PROGRAM

## 2021-04-29 PROCEDURE — 71045 X-RAY EXAM CHEST 1 VIEW: CPT | Performed by: STUDENT IN AN ORGANIZED HEALTH CARE EDUCATION/TRAINING PROGRAM

## 2021-04-29 PROCEDURE — 96374 THER/PROPH/DIAG INJ IV PUSH: CPT

## 2021-04-29 PROCEDURE — 84484 ASSAY OF TROPONIN QUANT: CPT | Performed by: STUDENT IN AN ORGANIZED HEALTH CARE EDUCATION/TRAINING PROGRAM

## 2021-04-29 PROCEDURE — 85379 FIBRIN DEGRADATION QUANT: CPT | Performed by: STUDENT IN AN ORGANIZED HEALTH CARE EDUCATION/TRAINING PROGRAM

## 2021-04-29 PROCEDURE — 99284 EMERGENCY DEPT VISIT MOD MDM: CPT

## 2021-04-29 PROCEDURE — 93005 ELECTROCARDIOGRAM TRACING: CPT

## 2021-04-29 PROCEDURE — 83690 ASSAY OF LIPASE: CPT | Performed by: STUDENT IN AN ORGANIZED HEALTH CARE EDUCATION/TRAINING PROGRAM

## 2021-04-29 PROCEDURE — 99285 EMERGENCY DEPT VISIT HI MDM: CPT

## 2021-04-29 PROCEDURE — 85025 COMPLETE CBC W/AUTO DIFF WBC: CPT | Performed by: STUDENT IN AN ORGANIZED HEALTH CARE EDUCATION/TRAINING PROGRAM

## 2021-04-29 PROCEDURE — 93010 ELECTROCARDIOGRAM REPORT: CPT

## 2021-04-29 RX ORDER — MAGNESIUM HYDROXIDE/ALUMINUM HYDROXICE/SIMETHICONE 120; 1200; 1200 MG/30ML; MG/30ML; MG/30ML
30 SUSPENSION ORAL ONCE
Status: COMPLETED | OUTPATIENT
Start: 2021-04-29 | End: 2021-04-29

## 2021-04-29 RX ORDER — FAMOTIDINE 20 MG/1
20 TABLET ORAL 2 TIMES DAILY PRN
Qty: 30 TABLET | Refills: 0 | Status: SHIPPED | OUTPATIENT
Start: 2021-04-29 | End: 2021-05-03 | Stop reason: ALTCHOICE

## 2021-04-29 RX ORDER — ASPIRIN 81 MG/1
324 TABLET, CHEWABLE ORAL ONCE
Status: DISCONTINUED | OUTPATIENT
Start: 2021-04-29 | End: 2021-04-29

## 2021-04-29 RX ORDER — ONDANSETRON 2 MG/ML
4 INJECTION INTRAMUSCULAR; INTRAVENOUS ONCE
Status: COMPLETED | OUTPATIENT
Start: 2021-04-29 | End: 2021-04-29

## 2021-04-29 RX ORDER — INDOMETHACIN 20 MG/1
CAPSULE ORAL
COMMUNITY
End: 2021-05-03 | Stop reason: ALTCHOICE

## 2021-04-29 NOTE — ED PROVIDER NOTES
Patient Seen in: [facility] [place]      History   Patient presents with:  Chest Pain Angina    Stated Complaint: chest pain, lightheaded     HPI/Subjective:   HPI    Patient is a 67-year-old female who presents emergency departmen are as noted in HPI. Constitutional and vital signs reviewed. All other systems reviewed and negative except as noted above.     Physical Exam     ED Triage Vitals [04/29/21 1328]   /68   Pulse 77   Resp 20   Temp 98.9 °F (37.2 °C)   Temp src Te CBC W/ DIFFERENTIAL[[id number]]                              Final result                 Please view results for these tests on the individual orders.    RAINBOW DRAW BLUE   RAINBOW DRAW LAVENDER   RAINBOW DRAW LIGHT GREEN   RAINBOW DRAW GOLD   CBC W/ taking these medications    famoTIDine (PEPCID) 20 MG Oral Tab  Take 1 tablet (20 mg total) by mouth 2 (two) times daily as needed for Heartburn., Normal, Disp-30 tablet, R-0

## 2021-04-29 NOTE — ED INITIAL ASSESSMENT (HPI)
States sudden onset substernal chest tightness. Also feels \"out of it\" denies belen, nausea or diaphoresis.  Currently being treated for foot tendonitis

## 2021-04-30 ENCOUNTER — TELEPHONE (OUTPATIENT)
Dept: INTERNAL MEDICINE CLINIC | Facility: CLINIC | Age: 36
End: 2021-04-30

## 2021-04-30 NOTE — TELEPHONE ENCOUNTER
SUSANNE   Pt seen in ER for Acute chest pain and  Transaminitis. TC to pt to schedule fu with Dr. Jose R Patton. Pt declined to schedule and will discuss at upcoming ov. Pt states it was due to a bad reaction to a medication she will given by another provider.

## 2021-05-01 NOTE — TELEPHONE ENCOUNTER
Received page from patient's  regarding patient have symptoms of panic attack. Spoke with patient who states she began having chest tightness tonight, along with palpations and SOB. She has some racing thoughts as well.  She has been trying to wean o

## 2021-05-03 NOTE — PROGRESS NOTES
Rebecca Gamble is a 43year old female. Patient presents with: Other: SN Rm 4; ER 4/29 for anxiety/\"out of body\", increased anxiety      HPI:     Patient here for ER follow up.  She went to ER on 4/29 for chest tightness, had work up done and they fel depression 6/28/2013   • BACK PAIN    • Bronchitis    • Hirsutism     FACE   • History of blood transfusion     postpartum 6/2013 d/t uterine atony   • Hypercholesterolemia    • Hyperlipidemia    • Personal history of malignant melanoma of skin 4-27-15 Panic attack - ok to sparingly use xanax 0.5mg prn anxiety attack. SE discussed including sedation    No orders of the defined types were placed in this encounter.       Meds & Refills for this Visit:  Requested Prescriptions     Signed Prescriptions Disp

## 2021-05-08 LAB
AMB EXT CMP ALT: 66 U/L
AMB EXT CMP AST: 48 U/L
AMB EXT CREATININE: 0.71 MG/DL
AMB EXT GFR: 108
AMB EXT HEMATOCRIT: 38
AMB EXT HEMOGLOBIN: 12.9
AMB EXT HGBA1C: 6.6 %
AMB EXT MCV: 93.4
AMB EXT PLATELETS: 282
AMB EXT POSTASSIUM: 4.2 MMOL/L
AMB EXT SODIUM: 138 MMOL/L
AMB EXT WBC: 7 X10(3)UL

## 2021-05-18 ENCOUNTER — OFFICE VISIT (OUTPATIENT)
Dept: INTERNAL MEDICINE CLINIC | Facility: CLINIC | Age: 36
End: 2021-05-18
Payer: COMMERCIAL

## 2021-05-18 VITALS
BODY MASS INDEX: 37.47 KG/M2 | HEART RATE: 100 BPM | RESPIRATION RATE: 16 BRPM | WEIGHT: 253 LBS | SYSTOLIC BLOOD PRESSURE: 104 MMHG | TEMPERATURE: 97 F | DIASTOLIC BLOOD PRESSURE: 64 MMHG | HEIGHT: 69 IN

## 2021-05-18 DIAGNOSIS — F41.9 ANXIETY AND DEPRESSION: ICD-10-CM

## 2021-05-18 DIAGNOSIS — F32.A ANXIETY AND DEPRESSION: ICD-10-CM

## 2021-05-18 DIAGNOSIS — E78.5 DYSLIPIDEMIA: ICD-10-CM

## 2021-05-18 DIAGNOSIS — Z00.00 ROUTINE GENERAL MEDICAL EXAMINATION AT A HEALTH CARE FACILITY: Primary | ICD-10-CM

## 2021-05-18 PROCEDURE — 99395 PREV VISIT EST AGE 18-39: CPT | Performed by: INTERNAL MEDICINE

## 2021-05-18 PROCEDURE — 3078F DIAST BP <80 MM HG: CPT | Performed by: INTERNAL MEDICINE

## 2021-05-18 PROCEDURE — 3074F SYST BP LT 130 MM HG: CPT | Performed by: INTERNAL MEDICINE

## 2021-05-18 PROCEDURE — 3008F BODY MASS INDEX DOCD: CPT | Performed by: INTERNAL MEDICINE

## 2021-05-18 RX ORDER — PRAVASTATIN SODIUM 40 MG
40 TABLET ORAL NIGHTLY
Qty: 90 TABLET | Refills: 3 | Status: SHIPPED | OUTPATIENT
Start: 2021-05-18 | End: 2021-08-10 | Stop reason: ALTCHOICE

## 2021-05-18 NOTE — PROGRESS NOTES
Patient presents with:  Physical: SN Rm 4      HPI:    Patient here for cpe gyne exam.  Plans to go in the summer for pap and breast exam, no acute complaints.  Periods regular   with 2 kids, she does not want any more kids  HL- familial, took rosuva uterine atony   • Hypercholesterolemia    • Hyperlipidemia    • Personal history of malignant melanoma of skin 4-27-15    MM - Right Upper Arm - WLE done by FEDERICO Hernandez MD.   • Post partum depression    • Postpartum depression, postpartum condition 6/28/201 Date(s) Administered    >= 3 Yrs, FLUZONE, Pres Free (57764), Influenza Vaccine, Flu Clinic                          10/17/2013      FLUZONE 6 months and older PFS 0.5 ml (90038)                          09/29/2020      Fluarix 6 Months And Older 0.5 Encounter      Lipid Panel [E]      Hepatic Function Panel (7) [E]      Meds & Refills for this Visit:  Requested Prescriptions     Signed Prescriptions Disp Refills   • Pravastatin Sodium 40 MG Oral Tab 90 tablet 3     Sig: Take 1 tablet (40 mg total) by

## 2021-07-12 ENCOUNTER — TELEMEDICINE (OUTPATIENT)
Dept: INTERNAL MEDICINE CLINIC | Facility: CLINIC | Age: 36
End: 2021-07-12

## 2021-07-12 DIAGNOSIS — U07.1 COVID-19 VIRUS INFECTION: Primary | ICD-10-CM

## 2021-07-12 PROCEDURE — 99213 OFFICE O/P EST LOW 20 MIN: CPT | Performed by: INTERNAL MEDICINE

## 2021-07-12 RX ORDER — BENZONATATE 100 MG/1
100 CAPSULE ORAL 3 TIMES DAILY PRN
Qty: 21 CAPSULE | Refills: 0 | Status: SHIPPED | OUTPATIENT
Start: 2021-07-12 | End: 2021-07-19

## 2021-07-12 RX ORDER — AMOXICILLIN AND CLAVULANATE POTASSIUM 875; 125 MG/1; MG/1
1 TABLET, FILM COATED ORAL 2 TIMES DAILY
Qty: 20 TABLET | Refills: 0 | Status: SHIPPED | OUTPATIENT
Start: 2021-07-12 | End: 2021-07-22

## 2021-07-12 NOTE — PROGRESS NOTES
Sharif Allen  4/26/1985    No chief complaint on file. Video encounter    SUBJECTIVE   Meredith Javier is a 43year old female with recent Covid infection who presents with persistent symptoms.     The patient was in her usual state of health during r Rodo Wilson MD.   • Post partum depression    • Postpartum depression, postpartum condition 6/28/2013   • Sinusitis       Patient Active Problem List:     Displacement of lumbar intervertebral disc without myelopathy     DDD (degenerative disc disease), lumbar symptoms    The patient indicates understanding of these issues and agrees to the plan. TODAY'S ORDERS     No orders of the defined types were placed in this encounter.       Meds & Refills:  Requested Prescriptions      No prescriptions requested or ord

## 2021-08-02 LAB
AMB EXT CMP ALT: 44 U/L
AMB EXT CMP AST: 33 U/L
AMB EXT HGBA1C: 6.3 %

## 2021-08-02 PROCEDURE — 3044F HG A1C LEVEL LT 7.0%: CPT | Performed by: PHYSICIAN ASSISTANT

## 2021-08-03 LAB
ALBUMIN/GLOBULIN RATIO: 1.4 (CALC) (ref 1–2.5)
ALBUMIN: 3.9 G/DL (ref 3.6–5.1)
ALKALINE PHOSPHATASE: 56 U/L (ref 31–125)
ALT: 46 U/L (ref 6–29)
AST: 33 U/L (ref 10–30)
BILIRUBIN, DIRECT: 0.1 MG/DL
BILIRUBIN, INDIRECT: 0.4 MG/DL (CALC) (ref 0.2–1.2)
BILIRUBIN, TOTAL: 0.5 MG/DL (ref 0.2–1.2)
CHOL/HDLC RATIO: 6.6 (CALC)
CHOLESTEROL, TOTAL: 223 MG/DL
GLOBULIN: 2.7 G/DL (CALC) (ref 1.9–3.7)
HDL CHOLESTEROL: 34 MG/DL
LDL-CHOLESTEROL: 140 MG/DL (CALC)
NON-HDL CHOLESTEROL: 189 MG/DL (CALC)
PROTEIN, TOTAL: 6.6 G/DL (ref 6.1–8.1)
TRIGLYCERIDES: 341 MG/DL

## 2021-08-10 ENCOUNTER — TELEMEDICINE (OUTPATIENT)
Dept: INTERNAL MEDICINE CLINIC | Facility: CLINIC | Age: 36
End: 2021-08-10

## 2021-08-10 ENCOUNTER — OFFICE VISIT (OUTPATIENT)
Dept: ENDOCRINOLOGY CLINIC | Facility: CLINIC | Age: 36
End: 2021-08-10
Payer: COMMERCIAL

## 2021-08-10 VITALS
HEIGHT: 69 IN | SYSTOLIC BLOOD PRESSURE: 116 MMHG | HEART RATE: 89 BPM | RESPIRATION RATE: 16 BRPM | DIASTOLIC BLOOD PRESSURE: 70 MMHG | WEIGHT: 248 LBS | BODY MASS INDEX: 36.73 KG/M2

## 2021-08-10 DIAGNOSIS — E78.2 MIXED HYPERLIPIDEMIA: ICD-10-CM

## 2021-08-10 DIAGNOSIS — E78.5 DYSLIPIDEMIA: Primary | ICD-10-CM

## 2021-08-10 DIAGNOSIS — E66.01 CLASS 2 SEVERE OBESITY DUE TO EXCESS CALORIES WITH SERIOUS COMORBIDITY AND BODY MASS INDEX (BMI) OF 36.0 TO 36.9 IN ADULT (HCC): ICD-10-CM

## 2021-08-10 DIAGNOSIS — Z86.16 PERSONAL HISTORY OF COVID-19: ICD-10-CM

## 2021-08-10 DIAGNOSIS — E11.9 NEW ONSET TYPE 2 DIABETES MELLITUS (HCC): ICD-10-CM

## 2021-08-10 DIAGNOSIS — E11.9 TYPE 2 DIABETES MELLITUS WITHOUT COMPLICATION, WITHOUT LONG-TERM CURRENT USE OF INSULIN (HCC): Primary | ICD-10-CM

## 2021-08-10 PROBLEM — E66.812 CLASS 2 SEVERE OBESITY DUE TO EXCESS CALORIES WITH SERIOUS COMORBIDITY AND BODY MASS INDEX (BMI) OF 36.0 TO 36.9 IN ADULT (HCC): Status: ACTIVE | Noted: 2021-08-10

## 2021-08-10 LAB
CREAT UR-SCNC: 250 MG/DL
MICROALBUMIN UR-MCNC: 1.29 MG/DL
MICROALBUMIN/CREAT 24H UR-RTO: 5.2 UG/MG (ref ?–30)

## 2021-08-10 PROCEDURE — 3078F DIAST BP <80 MM HG: CPT | Performed by: NURSE PRACTITIONER

## 2021-08-10 PROCEDURE — 3061F NEG MICROALBUMINURIA REV: CPT | Performed by: PHYSICIAN ASSISTANT

## 2021-08-10 PROCEDURE — 3008F BODY MASS INDEX DOCD: CPT | Performed by: NURSE PRACTITIONER

## 2021-08-10 PROCEDURE — 82043 UR ALBUMIN QUANTITATIVE: CPT | Performed by: NURSE PRACTITIONER

## 2021-08-10 PROCEDURE — 3074F SYST BP LT 130 MM HG: CPT | Performed by: NURSE PRACTITIONER

## 2021-08-10 PROCEDURE — 99213 OFFICE O/P EST LOW 20 MIN: CPT | Performed by: INTERNAL MEDICINE

## 2021-08-10 PROCEDURE — 82570 ASSAY OF URINE CREATININE: CPT | Performed by: NURSE PRACTITIONER

## 2021-08-10 PROCEDURE — 99214 OFFICE O/P EST MOD 30 MIN: CPT | Performed by: NURSE PRACTITIONER

## 2021-08-10 RX ORDER — ATORVASTATIN CALCIUM 20 MG/1
20 TABLET, FILM COATED ORAL NIGHTLY
Qty: 90 TABLET | Refills: 0 | Status: SHIPPED | OUTPATIENT
Start: 2021-08-10 | End: 2022-02-04

## 2021-08-10 RX ORDER — LANCETS
1 EACH MISCELLANEOUS DAILY
Qty: 100 EACH | Refills: 0 | Status: SHIPPED | OUTPATIENT
Start: 2021-08-10 | End: 2021-12-02

## 2021-08-10 RX ORDER — METFORMIN HYDROCHLORIDE 500 MG/1
500 TABLET, EXTENDED RELEASE ORAL
Qty: 90 TABLET | Refills: 1 | Status: SHIPPED | OUTPATIENT
Start: 2021-08-10 | End: 2021-12-02

## 2021-08-10 RX ORDER — BLOOD SUGAR DIAGNOSTIC
1 STRIP MISCELLANEOUS DAILY
Qty: 100 EACH | Refills: 3 | Status: SHIPPED | OUTPATIENT
Start: 2021-08-10 | End: 2021-12-02

## 2021-08-10 NOTE — PROGRESS NOTES
Due to COVID-19 ACTION PLAN, the patient's office visit was converted to a video visit with informed patient consent.    Time Spent: 11min    Subjective     HPI:   Kemal Pickett is a 43year old female who presents for f/u-  covid 19 infection- acquired on-going public health crisis/national emergency and because of restrictions of visitation. There are limitations of this visit as no physical exam could be performed. Every conscious effort was taken to allow for sufficient and adequate time.   This bill

## 2021-08-10 NOTE — PROGRESS NOTES
HPI:   Alyx Real is a 43year old female who presents for initial visit with provider for increased a1c% readings. Patient's A1c% 6.6%% in May per patient.     Patient presents with:  Diabetes: new pt diabetes management       Diabetes: stable, at go not use drugs. She is allergic to azithromycin, sulfa drugs cross reactors, sulfa antibiotics, and zoloft [sertraline hcl]. Pravastatin Sodium 40 MG Oral Tab, Take 1 tablet (40 mg total) by mouth nightly.   escitalopram 10 MG Oral Tab, Take 1 tablet ( rashes  RESPIRATORY: denies shortness of breath with exertion  CARDIOVASCULAR: denies chest pain on exertion  GI: denies abdominal pain and denies heartburn  NEURO: denies headaches and denies numbness and tingling to extremities    EXAM:   /70   Pul compliance and exercise, see ophthalmology soon and check feet daily. Patient to start Metformin ER 500mg daily    Discussed self monitoring blood glucose and the importance of monitoring.   Patient agreed to monitoring 2-3x/week - alternating fasting in metFORMIN HCl  MG Oral Tablet 24 Hr;  Take 1 tablet (500 mg total) by mouth daily with dinner.  -     MICROALB/CREAT RATIO, RANDOM URINE  -     Glucose Blood (CONTOUR NEXT TEST) In Vitro Strip; 1 each by Other route daily.  -     Microlet Lancets Do

## 2021-08-10 NOTE — PATIENT INSTRUCTIONS
We are here to support you with Diabetes but please remember that you still need your primary care doctor for your routine health maintenance. Your current A1C: 6.3%  This test provides us with your average blood sugar for the past 3 months.    The main g to monitor your kidney function (blood and urine protein levels) , liver function tests and cholesterol levels when you have diabetes. 2. FOOT EXAMS:  daily foot inspections for foot wounds or skin changes are important for foot care.  Any unusual mitchell

## 2021-08-11 ENCOUNTER — TELEMEDICINE (OUTPATIENT)
Dept: ENDOCRINOLOGY CLINIC | Facility: CLINIC | Age: 36
End: 2021-08-11

## 2021-08-11 DIAGNOSIS — E11.9 TYPE 2 DIABETES MELLITUS WITHOUT COMPLICATION, WITHOUT LONG-TERM CURRENT USE OF INSULIN (HCC): Primary | ICD-10-CM

## 2021-08-11 PROCEDURE — 97802 MEDICAL NUTRITION INDIV IN: CPT | Performed by: DIETITIAN, REGISTERED

## 2021-08-11 NOTE — PROGRESS NOTES
Aislinn Allen   4/26/1985 was seen for Medical Nutrition Therapy with Diabetes:    8/11/2021  Referring Provider: Claiborne Moritz  Start time: 12:30 End time: 1:15    Due to COVID-19 ACTION PLAN, the patient's office visit was conducted via real-time inter good blood glucose control and avoid weight gain/possibly reduce weight. Taught label reading: focus on counting grams of carb rather than looking at sugar.     Reviewed current diabetes medications: metformin  mg one tab with dinner (she just pick

## 2021-09-27 ENCOUNTER — OFFICE VISIT (OUTPATIENT)
Dept: DERMATOLOGY | Age: 36
End: 2021-09-27

## 2021-09-27 DIAGNOSIS — Z12.83 SCREENING EXAM FOR SKIN CANCER: ICD-10-CM

## 2021-09-27 DIAGNOSIS — B07.8 COMMON WART: Primary | ICD-10-CM

## 2021-09-27 PROCEDURE — 99214 OFFICE O/P EST MOD 30 MIN: CPT | Performed by: DERMATOLOGY

## 2021-09-27 PROCEDURE — 17110 DESTRUCTION B9 LES UP TO 14: CPT | Performed by: DERMATOLOGY

## 2021-09-27 RX ORDER — CLOTRIMAZOLE AND BETAMETHASONE DIPROPIONATE 10; .64 MG/G; MG/G
CREAM TOPICAL
Qty: 45 G | Refills: 6 | Status: SHIPPED | OUTPATIENT
Start: 2021-09-27

## 2021-09-27 RX ORDER — CLOTRIMAZOLE AND BETAMETHASONE DIPROPIONATE 10; .64 MG/G; MG/G
CREAM TOPICAL
Qty: 45 G | Refills: 6 | Status: SHIPPED | OUTPATIENT
Start: 2021-09-27 | End: 2021-09-27 | Stop reason: CLARIF

## 2021-10-12 PROBLEM — F41.1 GENERALIZED ANXIETY DISORDER: Status: ACTIVE | Noted: 2021-10-12

## 2021-10-18 ENCOUNTER — PATIENT MESSAGE (OUTPATIENT)
Dept: INTERNAL MEDICINE CLINIC | Facility: CLINIC | Age: 36
End: 2021-10-18

## 2021-10-18 ENCOUNTER — TELEPHONE (OUTPATIENT)
Dept: INTERNAL MEDICINE CLINIC | Facility: CLINIC | Age: 36
End: 2021-10-18

## 2021-10-18 NOTE — TELEPHONE ENCOUNTER
Pt calling to schedule pre-op. Having surgery 11-3 with Dr. Carli Aquino. Our form has been faxed to [phone]. Phone number is [phone].         Future Appointments   Date Time Provider Beckie Singh   10/27/2021 11:20 AM Ruddy Muniz

## 2021-10-20 ENCOUNTER — TELEPHONE (OUTPATIENT)
Dept: ENDOCRINOLOGY CLINIC | Facility: CLINIC | Age: 36
End: 2021-10-20

## 2021-10-20 NOTE — TELEPHONE ENCOUNTER
Received our pw back for pre-op. Placing in [employer] Pt scheduled on below.      Future Appointments   Date Time Provider Beckie Singh   10/27/2021 11:20 AM Ruddy Muniz MD [facility]

## 2021-10-21 NOTE — TELEPHONE ENCOUNTER
Pt aware the labs will be entered when she comes in for her appt on below  Future Appointments   Date Time Provider Beckie Samantha   10/27/2021 11:20 AM Raffi Bettencourt MD [facility]

## 2021-12-02 ENCOUNTER — OFFICE VISIT (OUTPATIENT)
Dept: INTERNAL MEDICINE CLINIC | Facility: CLINIC | Age: 36
End: 2021-12-02
Payer: COMMERCIAL

## 2021-12-02 VITALS
HEART RATE: 68 BPM | WEIGHT: 254.81 LBS | BODY MASS INDEX: 37.74 KG/M2 | DIASTOLIC BLOOD PRESSURE: 64 MMHG | OXYGEN SATURATION: 98 % | HEIGHT: 69 IN | SYSTOLIC BLOOD PRESSURE: 126 MMHG

## 2021-12-02 DIAGNOSIS — N89.8 VAGINAL DISCHARGE: ICD-10-CM

## 2021-12-02 DIAGNOSIS — N76.0 ACUTE VAGINITIS: Primary | ICD-10-CM

## 2021-12-02 DIAGNOSIS — E11.9 CONTROLLED TYPE 2 DIABETES MELLITUS WITHOUT COMPLICATION, WITHOUT LONG-TERM CURRENT USE OF INSULIN (HCC): ICD-10-CM

## 2021-12-02 PROCEDURE — 3078F DIAST BP <80 MM HG: CPT | Performed by: PHYSICIAN ASSISTANT

## 2021-12-02 PROCEDURE — 99213 OFFICE O/P EST LOW 20 MIN: CPT | Performed by: PHYSICIAN ASSISTANT

## 2021-12-02 PROCEDURE — 3074F SYST BP LT 130 MM HG: CPT | Performed by: PHYSICIAN ASSISTANT

## 2021-12-02 PROCEDURE — 3008F BODY MASS INDEX DOCD: CPT | Performed by: PHYSICIAN ASSISTANT

## 2021-12-02 RX ORDER — FLUCONAZOLE 150 MG/1
TABLET ORAL
Qty: 2 TABLET | Refills: 0 | Status: SHIPPED | OUTPATIENT
Start: 2021-12-02 | End: 2021-12-15

## 2021-12-02 NOTE — PROGRESS NOTES
Patient presents with:  Vaginal Problem: mn room 6 pt here for vaginal itching with some discharge        HPI:  Pt presents with c/o vaginal itching and discharge X 4-6 weeks, off and on. Thinks related to a different soap she was using. No f/c.   No uri Physical Exam  /64 (BP Location: Left arm, Patient Position: Sitting, Cuff Size: adult)   Pulse 68   Ht 5' 9\" (1.753 m)   Wt 254 lb 12.8 oz (115.6 kg)   LMP 08/12/2021 (Exact Date)   SpO2 98%   BMI 37.63 kg/m²   Constitutional:  No distress.

## 2021-12-09 NOTE — PROGRESS NOTES
Looks like date of colledtion needs to be verified. I saw pt on 12/2/21. Why is this coming in a week later?

## 2021-12-09 NOTE — PROGRESS NOTES
Pt notified of problem with test and that it was not performed. She states that the treatment I placed her on for a yeast infection at the visit has worked and her sxs have resolved. There is no need to further testing at this point.   I have asked the pt

## 2021-12-13 ENCOUNTER — TELEPHONE (OUTPATIENT)
Dept: INTERNAL MEDICINE CLINIC | Facility: CLINIC | Age: 36
End: 2021-12-13

## 2021-12-13 NOTE — TELEPHONE ENCOUNTER
Stephanie from [address] [place] [address] asking for a call back on this pt.   Please advise

## 2021-12-15 ENCOUNTER — OFFICE VISIT (OUTPATIENT)
Dept: INTERNAL MEDICINE CLINIC | Facility: CLINIC | Age: 36
End: 2021-12-15
Payer: COMMERCIAL

## 2021-12-15 VITALS
WEIGHT: 254 LBS | SYSTOLIC BLOOD PRESSURE: 98 MMHG | BODY MASS INDEX: 38 KG/M2 | HEART RATE: 68 BPM | TEMPERATURE: 97 F | DIASTOLIC BLOOD PRESSURE: 64 MMHG

## 2021-12-15 DIAGNOSIS — N76.0 ACUTE VAGINITIS: Primary | ICD-10-CM

## 2021-12-15 PROCEDURE — 3078F DIAST BP <80 MM HG: CPT | Performed by: PHYSICIAN ASSISTANT

## 2021-12-15 PROCEDURE — 3074F SYST BP LT 130 MM HG: CPT | Performed by: PHYSICIAN ASSISTANT

## 2021-12-15 PROCEDURE — 99213 OFFICE O/P EST LOW 20 MIN: CPT | Performed by: PHYSICIAN ASSISTANT

## 2021-12-15 RX ORDER — FLUCONAZOLE 150 MG/1
TABLET ORAL
Qty: 2 TABLET | Refills: 1 | Status: SHIPPED | OUTPATIENT
Start: 2021-12-15

## 2021-12-15 NOTE — PROGRESS NOTES
Patient presents with: Follow - Up: QAMAR rm 2 continued vaginal itching        HPI:  Patient presents with complaint of vaginal itching and discharge. Patient was seen on 12/2/2021 for similar symptoms.   Unfortunately the vaginitis/vaginosis panel that was X 2 doses. 2 tablet 1   • clotrimazole 2 % Vaginal Cream Place 1 Application vaginally nightly. 21 g 0   • escitalopram 10 MG Oral Tab Take 1 tablet (10 mg total) by mouth nightly.  90 tablet 3   • atorvastatin 20 MG Oral Tab Take 1 tablet (20 mg total) by Refills   • fluconazole 150 MG Oral Tab 2 tablet 1     Si PO q 72 hours X 2 doses. Imaging & Consults:  None    Return if symptoms worsen or fail to improve. There are no Patient Instructions on file for this visit.     All questions were answere

## 2021-12-20 ENCOUNTER — PATIENT MESSAGE (OUTPATIENT)
Dept: INTERNAL MEDICINE CLINIC | Facility: CLINIC | Age: 36
End: 2021-12-20

## 2021-12-22 RX ORDER — CLOTRIMAZOLE AND BETAMETHASONE DIPROPIONATE 10; .64 MG/G; MG/G
1 CREAM TOPICAL 2 TIMES DAILY
Qty: 45 G | Refills: 0 | Status: SHIPPED | OUTPATIENT
Start: 2021-12-22

## 2021-12-22 NOTE — TELEPHONE ENCOUNTER
From: Angelito Allen  To: Celestina Montero PA-C  Sent: 12/20/2021 11:00 PM CST  Subject: Continued Symptoms    I completed the medication for a yeast infection, but am continuing to have vaginal itchiness & redness on the inside skin.  It has not gotten a

## 2021-12-23 RX ORDER — CLOTRIMAZOLE AND BETAMETHASONE DIPROPIONATE 10; .64 MG/G; MG/G
1 CREAM TOPICAL 2 TIMES DAILY PRN
Qty: 60 G | Refills: 3 | Status: CANCELLED | OUTPATIENT
Start: 2021-12-23

## 2021-12-23 NOTE — TELEPHONE ENCOUNTER
Pt snt CB msg and she is worried CB will not see it before the holiday-here is her msg:    What I picked up at the pharmacy today was the same as the first prescription, Clotrimazole, for fungal infections.      Could you put in Lotrisone again for me?  Chris Foster

## 2022-02-01 ENCOUNTER — MED REC SCAN ONLY (OUTPATIENT)
Dept: INTERNAL MEDICINE CLINIC | Facility: CLINIC | Age: 37
End: 2022-02-01

## 2022-02-02 PROCEDURE — 3044F HG A1C LEVEL LT 7.0%: CPT | Performed by: FAMILY MEDICINE

## 2022-02-03 ENCOUNTER — TELEPHONE (OUTPATIENT)
Dept: INTERNAL MEDICINE CLINIC | Facility: CLINIC | Age: 37
End: 2022-02-03

## 2022-02-03 LAB
ALBUMIN: 4.5 G/DL
ALKALINE PHOSPHATASE: 57
ALT: 55
AST: 40
BUN/CREATININE RATIO: 12
BUN: 11
CALCIUM: 9.6
CARBON DIOXIDE: 22
CHLORIDE: 102
CHOL/HDL RATIO: 8
CREATININE: 0.9 MG/DL
GGT: 22 IU/L
GLOBULIN: 2.3
GLUCOSE: 146
HCT: 40.4
HDL CHOLESTEROL: 37 MG/DL
HEMOGLOBIN A1C: 6.6
HGB: 13.5
IRON: 103
LDH: 194 IU/L
LDL CHOLESTEROL: 207 MG/DL (ref ?–130)
MEAN CELL VOLUME: 93
MEAN CORPUSCULAR HEMOGLOBIN: 31
MEAN CORPUSCULAR HGB CONC: 33.4
PHOSPHORUS: 3.3
PLT: 321
POTASSIUM: 4.5
RED BLOOD COUNT: 4.36
RED CELL DISTRIBUTION WIDTH: 12.1
SODIUM: 138
TOTAL CHOLESTEROL: 297 MG/DL (ref ?–200)
TOTAL PROTEIN: 6.8
TRIGLYCERIDES: 267 MG/DL
URIC ACID: 7
WBC: 5.4

## 2022-02-03 NOTE — TELEPHONE ENCOUNTER
Spoke to pt. Pt said that she stopped taking statin about 2 months ago. She stated that this did not appear to be helping so she stopped. She also noticed that when she started the medication about 1.5-2 years ago, her a1c started to increase. This is another reason pt stopped, to see if her a1c will go down. Informed pt of other results and to continue to work on diet/exercise to promote weight loss. Pt stated understanding. Routing to CB to update on statin.

## 2022-02-03 NOTE — TELEPHONE ENCOUNTER
Chol is high (LDL and TGs). Please call pt, has she been compliant with Atorvastatin 20 mg daily? Further recs once I know that answer. A1c of 6.6 shows good control of DM. Remainder of labs are stable. Mildly elevated ALT consistent with prior diagnosis of fatty liver. Continue to work on diet, exercise and wt loss.

## 2022-02-04 RX ORDER — ATORVASTATIN CALCIUM 20 MG/1
20 TABLET, FILM COATED ORAL NIGHTLY
Qty: 90 TABLET | Refills: 0 | Status: SHIPPED | OUTPATIENT
Start: 2022-02-04

## 2022-02-04 NOTE — TELEPHONE ENCOUNTER
Comparing old labs her LDL is much worse than last check. She needs to restart Atorvastatin 20 mg daily and repeat lipids in 3 mos. Also her A1c is higher now, off statin, than it was on last check so results do not support her concerns. She can schedule OV with TB or I to discuss if further concerns.

## 2022-02-04 NOTE — TELEPHONE ENCOUNTER
Spoke to pt. Informed her that her LDL and a1c have gotten worse when off of statin. Provided lab results from august and now to compare values. Informed her of CB recommendation to go back on Atorvastatin 20 mg daily and repeat lipids in 3 mos. Advised to make appt with CB or TB if she would like to further discuss this. Pt stated understanding and agreed to plan. Routing to CB for fyi.

## 2022-05-05 ENCOUNTER — OFFICE VISIT (OUTPATIENT)
Dept: INTERNAL MEDICINE CLINIC | Facility: CLINIC | Age: 37
End: 2022-05-05
Payer: COMMERCIAL

## 2022-05-05 ENCOUNTER — HOSPITAL ENCOUNTER (OUTPATIENT)
Dept: GENERAL RADIOLOGY | Age: 37
Discharge: HOME | End: 2022-05-05
Attending: FAMILY MEDICINE
Payer: COMMERCIAL

## 2022-05-05 VITALS — WEIGHT: 254 LBS | BODY MASS INDEX: 37.62 KG/M2 | HEIGHT: 69 IN

## 2022-05-05 DIAGNOSIS — J30.9 ALLERGIC RHINITIS, UNSPECIFIED SEASONALITY, UNSPECIFIED TRIGGER: ICD-10-CM

## 2022-05-05 DIAGNOSIS — R05.9 COUGH: ICD-10-CM

## 2022-05-05 DIAGNOSIS — R09.82 PND (POST-NASAL DRIP): ICD-10-CM

## 2022-05-05 DIAGNOSIS — R05.9 COUGH: Primary | ICD-10-CM

## 2022-05-05 PROCEDURE — 71046 X-RAY EXAM CHEST 2 VIEWS: CPT | Performed by: FAMILY MEDICINE

## 2022-05-05 PROCEDURE — 99214 OFFICE O/P EST MOD 30 MIN: CPT | Performed by: FAMILY MEDICINE

## 2022-05-05 PROCEDURE — 3008F BODY MASS INDEX DOCD: CPT | Performed by: FAMILY MEDICINE

## 2022-05-05 RX ORDER — CODEINE PHOSPHATE AND GUAIFENESIN 10; 100 MG/5ML; MG/5ML
5 SOLUTION ORAL 3 TIMES DAILY PRN
Qty: 105 ML | Refills: 0 | Status: SHIPPED | OUTPATIENT
Start: 2022-05-05 | End: 2022-05-12

## 2022-05-05 RX ORDER — BENZONATATE 100 MG/1
100 CAPSULE ORAL 3 TIMES DAILY PRN
Qty: 21 CAPSULE | Refills: 0 | Status: SHIPPED | OUTPATIENT
Start: 2022-05-05 | End: 2022-05-12

## 2022-05-06 LAB — SARS-COV-2 RNA RESP QL NAA+PROBE: NOT DETECTED

## 2022-05-15 ENCOUNTER — APPOINTMENT (OUTPATIENT)
Dept: GENERAL RADIOLOGY | Age: 37
End: 2022-05-15
Attending: NURSE PRACTITIONER
Payer: COMMERCIAL

## 2022-05-15 ENCOUNTER — HOSPITAL ENCOUNTER (EMERGENCY)
Age: 37
Discharge: HOME | End: 2022-05-15
Attending: EMERGENCY MEDICINE
Payer: COMMERCIAL

## 2022-05-15 VITALS
SYSTOLIC BLOOD PRESSURE: 110 MMHG | HEIGHT: 69 IN | RESPIRATION RATE: 16 BRPM | WEIGHT: 250 LBS | TEMPERATURE: 98 F | DIASTOLIC BLOOD PRESSURE: 71 MMHG | OXYGEN SATURATION: 97 % | BODY MASS INDEX: 37.03 KG/M2 | HEART RATE: 65 BPM
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
## 2022-05-15 DIAGNOSIS — M54.50 ACUTE BILATERAL LOW BACK PAIN WITHOUT SCIATICA: ICD-10-CM

## 2022-05-15 DIAGNOSIS — M54.2 NECK PAIN: Primary | ICD-10-CM

## 2022-05-15 PROCEDURE — 99284 EMERGENCY DEPT VISIT MOD MDM: CPT

## 2022-05-15 PROCEDURE — 72040 X-RAY EXAM NECK SPINE 2-3 VW: CPT | Performed by: NURSE PRACTITIONER

## 2022-05-15 PROCEDURE — 72110 X-RAY EXAM L-2 SPINE 4/>VWS: CPT | Performed by: NURSE PRACTITIONER

## 2022-05-15 RX ORDER — IBUPROFEN 600 MG/1
600 TABLET ORAL ONCE
Status: COMPLETED | OUTPATIENT
Start: 2022-05-15 | End: 2022-05-15

## 2022-05-15 RX ORDER — CYCLOBENZAPRINE HCL 10 MG
10 TABLET ORAL 3 TIMES DAILY PRN
Qty: 20 TABLET | Refills: 0 | Status: SHIPPED | OUTPATIENT
Start: 2022-05-15 | End: 2022-05-22
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
## 2022-06-09 ENCOUNTER — OFFICE VISIT (OUTPATIENT)
Dept: DERMATOLOGY | Age: 37
End: 2022-06-09

## 2022-06-09 DIAGNOSIS — L57.0 ACTINIC KERATOSES: ICD-10-CM

## 2022-06-09 DIAGNOSIS — D18.01 CHERRY ANGIOMA: Primary | ICD-10-CM

## 2022-06-09 DIAGNOSIS — Z12.83 SCREENING EXAM FOR SKIN CANCER: ICD-10-CM

## 2022-06-09 PROCEDURE — 99213 OFFICE O/P EST LOW 20 MIN: CPT | Performed by: DERMATOLOGY

## 2022-06-09 PROCEDURE — 17000 DESTRUCT PREMALG LESION: CPT | Performed by: DERMATOLOGY

## 2022-06-13 ENCOUNTER — APPOINTMENT (OUTPATIENT)
Dept: DERMATOLOGY | Age: 37
End: 2022-06-13

## 2022-12-19 ENCOUNTER — TELEPHONE (OUTPATIENT)
Dept: INTERNAL MEDICINE CLINIC | Facility: CLINIC | Age: 37
End: 2022-12-19

## 2023-01-23 ENCOUNTER — TELEPHONE (OUTPATIENT)
Dept: INTERNAL MEDICINE CLINIC | Facility: CLINIC | Age: 38
End: 2023-01-23

## 2023-01-24 NOTE — TELEPHONE ENCOUNTER
CPE due. Call to schedule. Last CPE was with TB. Unsure who she will want to schedule with. Also due for  DM follow up.

## 2023-02-07 ENCOUNTER — TELEPHONE (OUTPATIENT)
Dept: INTERNAL MEDICINE CLINIC | Facility: CLINIC | Age: 38
End: 2023-02-07

## 2023-02-07 DIAGNOSIS — Z13.29 SCREENING FOR ENDOCRINE, NUTRITIONAL, METABOLIC AND IMMUNITY DISORDER: ICD-10-CM

## 2023-02-07 DIAGNOSIS — Z13.21 SCREENING FOR ENDOCRINE, NUTRITIONAL, METABOLIC AND IMMUNITY DISORDER: ICD-10-CM

## 2023-02-07 DIAGNOSIS — Z13.228 SCREENING FOR ENDOCRINE, NUTRITIONAL, METABOLIC AND IMMUNITY DISORDER: ICD-10-CM

## 2023-02-07 DIAGNOSIS — Z13.0 SCREENING FOR ENDOCRINE, NUTRITIONAL, METABOLIC AND IMMUNITY DISORDER: ICD-10-CM

## 2023-02-07 DIAGNOSIS — Z13.220 SCREENING FOR LIPID DISORDERS: ICD-10-CM

## 2023-02-07 DIAGNOSIS — Z00.00 ROUTINE GENERAL MEDICAL EXAMINATION AT A HEALTH CARE FACILITY: Primary | ICD-10-CM

## 2023-02-07 DIAGNOSIS — Z13.29 SCREENING FOR THYROID DISORDER: ICD-10-CM

## 2023-02-07 DIAGNOSIS — Z13.0 SCREENING FOR DISORDER OF BLOOD AND BLOOD-FORMING ORGANS: ICD-10-CM

## 2023-02-07 NOTE — TELEPHONE ENCOUNTER
Future Appointments   Date Time Provider Beckie Samantha   3/9/2023  4:00 PM Shubham Segal PA-C [facility]     Informed must fast no call back required.  Orders to Gopi Almodovar

## 2023-02-07 NOTE — TELEPHONE ENCOUNTER
Lm for pt to schedule cpe and DM fu.    Mailing letter to home.     If no response in one week route to [address] [facility] for PCP removal

## 2023-02-08 NOTE — TELEPHONE ENCOUNTER
Pt scheduled.     Future Appointments   Date Time Provider Beckie Singh   2/8/2023  4:30 PM BARBY Chaudhary [facility]   3/9/2023  4:00 PM Len Logan PA-C [facility]   3/27/2023 10:00 AM Desire Wong APN [facility]

## 2023-02-14 ENCOUNTER — TELEPHONE (OUTPATIENT)
Dept: INTERNAL MEDICINE CLINIC | Facility: CLINIC | Age: 38
End: 2023-02-14

## 2023-02-15 NOTE — TELEPHONE ENCOUNTER
Future Appointments   Date Time Provider Beckie Singh   3/9/2023  4:00 PM Юлия Porter PA-C [facility]

## 2023-02-15 NOTE — TELEPHONE ENCOUNTER
Pt is a diabetic that I have not seen since Dec 2021. Last saw [facility] 5/5/22 for cough. Are we still PCP? If yes then needs OV ASAP. Message to provider she schedules with to order labs if she schedules. Remove TB as PCP if new PCP. Looks like she used to follow in DM clinic but not since 8/21.

## 2023-03-08 ENCOUNTER — HOSPITAL ENCOUNTER (OUTPATIENT)
Age: 38
Discharge: HOME | End: 2023-03-08
Payer: COMMERCIAL

## 2023-03-08 VITALS
DIASTOLIC BLOOD PRESSURE: 91 MMHG | HEIGHT: 69 IN | TEMPERATURE: 98 F | BODY MASS INDEX: 33.33 KG/M2 | HEART RATE: 112 BPM | SYSTOLIC BLOOD PRESSURE: 137 MMHG | WEIGHT: 225 LBS | RESPIRATION RATE: 18 BRPM | OXYGEN SATURATION: 99 %

## 2023-03-08 DIAGNOSIS — R50.9 FEVER: ICD-10-CM

## 2023-03-08 DIAGNOSIS — J02.9 PHARYNGITIS, UNSPECIFIED ETIOLOGY: Primary | ICD-10-CM

## 2023-03-08 LAB
S PYO AG THROAT QL: NEGATIVE
SARS-COV-2 RNA RESP QL NAA+PROBE: NOT DETECTED

## 2023-03-08 PROCEDURE — 99213 OFFICE O/P EST LOW 20 MIN: CPT | Performed by: NURSE PRACTITIONER

## 2023-03-08 PROCEDURE — U0002 COVID-19 LAB TEST NON-CDC: HCPCS | Performed by: NURSE PRACTITIONER

## 2023-03-08 PROCEDURE — 87880 STREP A ASSAY W/OPTIC: CPT | Performed by: NURSE PRACTITIONER

## 2023-03-08 RX ORDER — LIDOCAINE HYDROCHLORIDE 20 MG/ML
5 SOLUTION OROPHARYNGEAL
Qty: 100 ML | Refills: 0 | Status: SHIPPED | OUTPATIENT
Start: 2023-03-08

## 2023-03-08 RX ORDER — ACETAMINOPHEN 500 MG
500 TABLET ORAL EVERY 6 HOURS PRN
COMMUNITY

## 2023-03-08 NOTE — DISCHARGE INSTRUCTIONS
Please monitor your blood sugar levels closely as they will most likely be elevated while you are sick. -Drink plenty of fluids. Use a humidifier.   -Get plenty of rest.   Acetaminophen if needed for fever or body aches. Only take this medication if you are not allergic to it or have no other contraindications to taking this medicine. Viscous lidocaine gargles for your sore throat. You may gargle with salt water (1/2- 1 tsp of salt in 8 oz of warm water) or lozenges / throat sprays for throat discomfort. If you develop a cough, you may take over-the-counter cough and cold remedies. Recommend avoiding NSAIDs like ibuprofen, Motrin, Aleve, naproxen. It is safer for you to take acetaminophen. You should also suck on cough drops. Use a humidifier or inhale steam from a shower to increase air moisture. This may make it easier to breathe. Drink enough fluid to keep your urine clear or pale yellow. Rest as needed. Seek immediate medical care if your symptoms are persistent or worsening: If you have increased throat pain or difficulty swallowing or voice changes, spiking fevers, chest pain, shortness of breath, weakness, fatigue, worsening cough, feeling dehydrated.

## 2023-03-08 NOTE — ED INITIAL ASSESSMENT (HPI)
Pt sts fever as high as 102, sore throat, HA for the past 3 days.  Sts tested negative today with at home test.

## 2023-03-09 ENCOUNTER — TELEMEDICINE (OUTPATIENT)
Dept: TELEHEALTH | Age: 38
End: 2023-03-09

## 2023-03-09 DIAGNOSIS — J02.9 VIRAL PHARYNGITIS: Primary | ICD-10-CM

## 2023-03-09 PROCEDURE — 99213 OFFICE O/P EST LOW 20 MIN: CPT | Performed by: PHYSICIAN ASSISTANT

## 2023-03-09 RX ORDER — PREDNISONE 20 MG/1
40 TABLET ORAL DAILY
Qty: 10 TABLET | Refills: 0 | Status: SHIPPED | OUTPATIENT
Start: 2023-03-09 | End: 2023-03-14

## 2023-03-27 ENCOUNTER — OFFICE VISIT (OUTPATIENT)
Dept: ENDOCRINOLOGY CLINIC | Facility: CLINIC | Age: 38
End: 2023-03-27
Payer: COMMERCIAL

## 2023-03-27 VITALS
RESPIRATION RATE: 16 BRPM | BODY MASS INDEX: 37.77 KG/M2 | SYSTOLIC BLOOD PRESSURE: 116 MMHG | DIASTOLIC BLOOD PRESSURE: 80 MMHG | HEART RATE: 97 BPM | WEIGHT: 255 LBS | HEIGHT: 69 IN

## 2023-03-27 DIAGNOSIS — E78.2 MIXED HYPERLIPIDEMIA: ICD-10-CM

## 2023-03-27 DIAGNOSIS — E11.9 TYPE 2 DIABETES MELLITUS WITHOUT COMPLICATION, WITHOUT LONG-TERM CURRENT USE OF INSULIN (HCC): Primary | ICD-10-CM

## 2023-03-27 PROCEDURE — 3008F BODY MASS INDEX DOCD: CPT | Performed by: NURSE PRACTITIONER

## 2023-03-27 PROCEDURE — 3079F DIAST BP 80-89 MM HG: CPT | Performed by: NURSE PRACTITIONER

## 2023-03-27 PROCEDURE — 99214 OFFICE O/P EST MOD 30 MIN: CPT | Performed by: NURSE PRACTITIONER

## 2023-03-27 PROCEDURE — 3074F SYST BP LT 130 MM HG: CPT | Performed by: NURSE PRACTITIONER

## 2023-03-27 RX ORDER — BLOOD-GLUCOSE SENSOR
1 EACH MISCELLANEOUS
Qty: 2 EACH | Refills: 11 | Status: SHIPPED | OUTPATIENT
Start: 2023-03-27

## 2023-03-27 RX ORDER — SEMAGLUTIDE 0.68 MG/ML
0.25 INJECTION, SOLUTION SUBCUTANEOUS WEEKLY
Qty: 3 ML | Refills: 0 | COMMUNITY
Start: 2023-03-27

## 2023-03-27 NOTE — PATIENT INSTRUCTIONS
We are here to support you with Diabetes but please remember that you still need your primary care doctor for your routine health maintenance. Your current A1C: 6.6%  This test provides us with your average blood sugar for the past 3 months. The main goal of diabetes treatment is to keep your sugar from going too high. We measure your overall blood sugar trends with a Hemoglobin A1C test. (also called an A1C)  For most people the target is less than 7.0% but sometimes we make exceptions based on age, health history and other factors. Keeping an A1C less than 7% helps prevents diabetes related health problems. If your A1C goes too high, then we need to talk about changing your current diabetes treatment. MEDICATIONS:   It is important to take all of your medications as prescribed. Please call me if you cannot get the prescriptions filled or are having issues with refills. Also, please call me if you have any issues with medication questions, side effects, dosing questions or problems with your blood sugar trends BEFORE CHANGING OR STOPPING ANY MEDICATIONS. Continue Metformin 500mg daily  Start Ozempic 0.25mg injection weekly    Blood sugar testing:   Always bring your glucose meter or blood sugar logbook to every appointment here at the diabetes center. This allows me to safely make adjustments to your diabetes plan. In order for me to determine any patterns in your blood sugars, you will need to test your blood sugar 2 times daily   It would be best to change up the times of day that you are testing your sugar. Always test before breakfast (fasting) and then alternate testing blood sugar 2 hours after your meals. Blood sugar targets:  Before breakfast:   (preferably < 110)  2 hours After meals: less than 180 (preferably less than 150)   Call for persistent blood sugars < 75 or > 200.    Blood sugars greater than 200 are not acceptable to reach your goal of improving diabetes Health Maintenance:   1. LABS: It is important to monitor your kidney function (blood and urine protein levels) , liver function tests and cholesterol levels when you have diabetes. 2. FOOT EXAMS:  daily foot inspections for foot wounds or skin changes are important for foot care. Any unusual changes should be reported immediately. 3. EYE EXAMS: Checking your eyes for diabetes changes is important and you should have a dilated eye exam done by an eye doctor EVERY year since these changes occur in the BACK of the eye and not visible by you. Please let me know if you need provider list for eye doctor. Lifestyle Therapy:    1. NUTRITION: Maintain optimal weight, calorie restriction if overweight, plant-based diet    2. PHYSICAL ACTIVITY: 150 minutes per week (30 minutes a day 5 days a week) of moderate exertion such as walking, stair climbing. Include strength training 2-3 times per week. Increase as tolerated. 3. SLEEP: Try and get 7-8 hours of sleep per night    4. BEHAVIOR:  Tobacco cessation and alcohol in moderation      Reminders:  Due for dilated eye exam  Have fasting labs done prior to next appointment  Go to [employer] to obtain copay card for Judy 30, BC-ADM, [facility]  [address], [address], [address]  [address] [place] [address], [address] [facility], [address]  [address]  [address], [address] [place] [address]  Diabetes Services at [address] [facility] [address]

## 2023-04-06 ENCOUNTER — TELEPHONE (OUTPATIENT)
Dept: INTERNAL MEDICINE CLINIC | Facility: CLINIC | Age: 38
End: 2023-04-06

## 2023-04-06 NOTE — TELEPHONE ENCOUNTER
Received lab reports from [address] [place] [address].  Who ordered these? There are abnormalities we need to confirm ordering provider has received results as our office is listed. Patient is overdue for CPE last 5/21. Last ov have been for acute problems. Follows with diabetic clinic and has upcoming ov.

## 2023-04-06 NOTE — TELEPHONE ENCOUNTER
Ashley Jones brought lab results to triage office; they are in the wall bin labeled TE documents/RNs only.

## 2023-04-06 NOTE — TELEPHONE ENCOUNTER
Left message to call us back,  Also sent [employer] to ask who ordered these so we can get this to the right provider to review and for medical records. Need fax and name of provider if we need to fax these to someone.

## 2023-04-20 DIAGNOSIS — E11.9 TYPE 2 DIABETES MELLITUS WITHOUT COMPLICATION, WITHOUT LONG-TERM CURRENT USE OF INSULIN (HCC): ICD-10-CM

## 2023-04-20 LAB
AMB EXT CALCIUM: 9.4
AMB EXT CHLORIDE: 107
AMB EXT CHOL/HDL RATIO: 7.3
AMB EXT CHOLESTEROL, TOTAL: 254 MG/DL
AMB EXT CMP ALT: 23 U/L
AMB EXT CMP AST: 21 U/L
AMB EXT CREATININE, URINE: 1.1 MG/DL
AMB EXT CREATININE: 0.79 MG/DL
AMB EXT GLUCOSE: 122 MG/DL
AMB EXT HDL CHOLESTEROL: 35 MG/DL
AMB EXT HGBA1C: 6.3 %
AMB EXT LDL CHOLESTEROL, DIRECT: 180 MG/DL
AMB EXT MALB URINE CALC: 191 MG/24HR
AMB EXT MALB/CRE CALC: 6 UG/MG
AMB EXT NON HDL CHOL: 219 MG/DL
AMB EXT POSTASSIUM: 4.4 MMOL/L
AMB EXT TRIGLYCERIDES: 219 MG/DL
EGFR: 98

## 2023-04-20 RX ORDER — BLOOD-GLUCOSE SENSOR
1 EACH MISCELLANEOUS
Qty: 6 EACH | Refills: 3 | Status: SHIPPED | OUTPATIENT
Start: 2023-04-20

## 2023-04-21 LAB
ALBUMIN/GLOBULIN RATIO: 1.9 (CALC) (ref 1–2.5)
ALBUMIN: 4.3 G/DL (ref 3.6–5.1)
ALKALINE PHOSPHATASE: 55 U/L (ref 31–125)
ALT: 23 U/L (ref 6–29)
AST: 21 U/L (ref 10–30)
BILIRUBIN, TOTAL: 0.5 MG/DL (ref 0.2–1.2)
BUN: 14 MG/DL (ref 7–25)
CALCIUM: 9.4 MG/DL (ref 8.6–10.2)
CARBON DIOXIDE: 20 MMOL/L (ref 20–32)
CHLORIDE: 107 MMOL/L (ref 98–110)
CHOL/HDLC RATIO: 7.3 (CALC)
CHOLESTEROL, TOTAL: 254 MG/DL
CREATININE, RANDOM URINE: 191 MG/DL (ref 20–275)
CREATININE: 0.79 MG/DL (ref 0.5–0.97)
EGFR: 98 ML/MIN/1.73M2
GLOBULIN: 2.3 G/DL (CALC) (ref 1.9–3.7)
GLUCOSE: 122 MG/DL (ref 65–99)
HDL CHOLESTEROL: 35 MG/DL
HEMOGLOBIN A1C: 6.3 % OF TOTAL HGB
LDL-CHOLESTEROL: 180 MG/DL (CALC)
MICROALBUMIN/CREATININE RATIO, RANDOM URINE: 6 MCG/MG CREAT
MICROALBUMIN: 1.1 MG/DL
NON-HDL CHOLESTEROL: 219 MG/DL (CALC)
POTASSIUM: 4.4 MMOL/L (ref 3.5–5.3)
PROTEIN, TOTAL: 6.6 G/DL (ref 6.1–8.1)
SODIUM: 138 MMOL/L (ref 135–146)
TRIGLYCERIDES: 219 MG/DL

## 2023-04-24 ENCOUNTER — TELEPHONE (OUTPATIENT)
Dept: ENDOCRINOLOGY CLINIC | Facility: CLINIC | Age: 38
End: 2023-04-24

## 2023-04-24 NOTE — TELEPHONE ENCOUNTER
Received labs for pt abstracted into epic attached bar code and placed on [facility] to review

## 2023-04-24 NOTE — TELEPHONE ENCOUNTER
Reviewed, will discuss with patient during upcoming appointment on 4/28/2023.     Ailyn DIOR, [facility]AD[facility], [facility]

## 2023-04-24 NOTE — TELEPHONE ENCOUNTER
From: DILAN Sanders  To: Jeyson Allen  Sent: 4/24/2023 2:29 PM CDT  Subject: Upcoming telehealth visit    Whitney Mcdaniel have an upcoming telephone visit with me on Friday, April 28th at 3p. In order for me to prepare for our visit please send your glucose readings through [employer] by Thursday afternoon for review. Please contact the office at [phone] if you have any questions or if you need to reschedule.     Thank you,  Suman Jones APRN, BC-ADM, [facility]

## 2023-04-28 ENCOUNTER — VIRTUAL PHONE E/M (OUTPATIENT)
Dept: ENDOCRINOLOGY CLINIC | Facility: CLINIC | Age: 38
End: 2023-04-28
Payer: COMMERCIAL

## 2023-04-28 DIAGNOSIS — E11.9 TYPE 2 DIABETES MELLITUS WITHOUT COMPLICATION, WITHOUT LONG-TERM CURRENT USE OF INSULIN (HCC): Primary | ICD-10-CM

## 2023-04-28 DIAGNOSIS — E78.2 MIXED HYPERLIPIDEMIA: ICD-10-CM

## 2023-04-28 PROCEDURE — G2252 BRIEF CHKIN BY MD/QHP, 11-20: HCPCS | Performed by: NURSE PRACTITIONER

## 2023-04-28 RX ORDER — SEMAGLUTIDE 0.68 MG/ML
0.5 INJECTION, SOLUTION SUBCUTANEOUS WEEKLY
Qty: 3 ML | Refills: 0 | Status: SHIPPED | OUTPATIENT
Start: 2023-04-28

## 2023-04-28 NOTE — PROGRESS NOTES
HPI:   Glo Kamara is a 34year old female who presents virtually for her management of diabetes. This visit is conducted using Telemedicine with live, interactive audio due to COVID-19. Patient verbally consents to Telemedicine visit. Patient understands and accepts financial responsibility for any deductible, co-insurance and/or co-pays associated with this service. Diabetes: stable, at goal  Type: 2   Duration:dx May 2021  Current Meds: Metformin 500mg po daily, Ozempic 0.25mg injection weekly   SE: some stomach upset a few hours after administering Ozempic then resolves  Long term insulin use: n/a  Failed Meds: none    Complications: none    Testing with: [employer] Contour  Monitoring blood glucose: daily  Average glucose readin mg/dl  Highest glucose readin mg/dl   Lowest glucose readin mg/dl  Hypoglycemia frequency:  denies     Overall glucose control:   HGBA1C:    Lab Results   Component Value Date    A1C 6.3 (H) 2023    A1C 6.3 2023    A1C 6.6 2022     2020          Past History:   She  has a past medical history of Anxiety and depression (2013), BACK PAIN, Bronchitis, Diabetes ([address] Hirsutism, History of blood transfusion, Hypercholesterolemia, Hyperlipidemia, LGA (large for gestational age) fetus (2015), Personal history of malignant melanoma of skin (2015), Post partum depression, Postpartum depression, postpartum condition (2013), and Sinusitis. Her family history includes Alcohol and Other Disorders Associated in her maternal grandfather and maternal grandmother; Breast Cancer (age of onset: 39) in her paternal grandmother; Cancer in her maternal grandfather, maternal grandmother, and paternal grandmother; Depression in her brother and mother; Diabetes in her mother; Heart Disease in her paternal grandfather; Obesity in her brother. She  reports that she has never smoked.  She has never used smokeless tobacco. She reports that she does not drink alcohol and does not use drugs. She is allergic to azithromycin, sulfa antibiotics, sulfa drugs cross reactors, and zoloft [sertraline hcl]. Continuous Blood Gluc Sensor (FREESTYLE TINO 3 SENSOR) Does not apply Misc, 1 each every 14 (fourteen) days. metFORMIN 500 MG Oral Tab, Take 1 tablet (500 mg total) by mouth daily with breakfast.  [DISCONTINUED] Semaglutide,0.25 or 0.5MG/DOS, (OZEMPIC, 0.25 OR 0.5 MG/DOSE,) 2 MG/3ML Subcutaneous Solution Pen-injector, Inject 0.25 mg into the skin once a week. Azelaic Acid 15 % External Gel, APPLY PEA SIZED AMOUNT TOPICALLY TO ENTIRE FACE TWICE DAILY WITH MOISTURIZER  escitalopram 10 MG Oral Tab, Take 1 tablet (10 mg total) by mouth nightly. fluticasone propionate 50 MCG/ACT Nasal Suspension, 2 sprays by Nasal route daily. AUVI-Q 0.3 MG/0.3ML Injection Solution Auto-injector, Inject 0.3 mL (1 each total) as directed as needed. NALTREXONE HCL OR, Take 4.5 mg by mouth daily. Levocetirizine Dihydrochloride 5 MG Oral Tab, Take 1 tablet (5 mg total) by mouth every evening. No current facility-administered medications on file prior to visit. Reviewed lab results with patient    ASSESSMENT AND PLAN:   Diabetes:  Patient to continue Metformin 500mg daily and increase Ozempic to 0.5mg injection weekly  Discussed self monitoring blood glucose and the importance of monitoring. Patient agreed to monitoring bid - fasting in AM and 2 hours postprandial of one meal per day. Reinforced healthy eating in healthy portions and increasing daily physical activity. Hyperlipidemia:  Discussed ACC/AHA/ADA guidelines for initiation of statin therapy. Patient verbalizes understanding but declines at this time. Will continue to monitor closely. The patient indicates understanding of these issues and agrees to the plan. Refills addressed at time of office visit.     Diagnoses and all orders for this visit:    Type 2 diabetes mellitus without complication, without long-term current use of insulin (HCC)  -     Semaglutide,0.25 or 0.5MG/DOS, (OZEMPIC, 0.25 OR 0.5 MG/DOSE,) 2 MG/3ML Subcutaneous Solution Pen-injector; Inject 0.5 mg into the skin once a week. Mixed hyperlipidemia       Return in about 6 weeks (around 6/9/2023) for 30 minute appointment, Telehealth Visit. The risks and benefits of my recommendations, as well as other treatment options were discussed with the patient today. questions were answered to the best of my knowledge. Patient verbalizes understanding and amendable to plan of care.   Duration of this service:  12 minutes   Jimmy DIOR, BC-ADM, [facility]

## 2023-05-23 ENCOUNTER — OFFICE VISIT (OUTPATIENT)
Dept: URGENT CARE | Age: 38
End: 2023-05-23

## 2023-05-23 VITALS
BODY MASS INDEX: 37.03 KG/M2 | SYSTOLIC BLOOD PRESSURE: 122 MMHG | TEMPERATURE: 98.1 F | OXYGEN SATURATION: 99 % | HEART RATE: 72 BPM | RESPIRATION RATE: 18 BRPM | DIASTOLIC BLOOD PRESSURE: 78 MMHG | WEIGHT: 250 LBS | HEIGHT: 69 IN

## 2023-05-23 DIAGNOSIS — Z20.818 EXPOSURE TO STREP THROAT: Primary | ICD-10-CM

## 2023-05-23 LAB
INTERNAL PROCEDURAL CONTROLS ACCEPTABLE: YES
S PYO AG THROAT QL IA.RAPID: NEGATIVE

## 2023-05-23 PROCEDURE — 87880 STREP A ASSAY W/OPTIC: CPT | Performed by: NURSE PRACTITIONER

## 2023-05-23 PROCEDURE — 87081 CULTURE SCREEN ONLY: CPT | Performed by: INTERNAL MEDICINE

## 2023-05-23 PROCEDURE — 99213 OFFICE O/P EST LOW 20 MIN: CPT | Performed by: NURSE PRACTITIONER

## 2023-05-23 RX ORDER — SEMAGLUTIDE 0.68 MG/ML
INJECTION, SOLUTION SUBCUTANEOUS
COMMUNITY

## 2023-05-23 ASSESSMENT — ENCOUNTER SYMPTOMS
COUGH: 0
SORE THROAT: 0
CHILLS: 0
FEVER: 0

## 2023-05-26 ENCOUNTER — TELEPHONE (OUTPATIENT)
Dept: URGENT CARE | Age: 38
End: 2023-05-26

## 2023-05-26 LAB — S PYO SPEC QL CULT: NORMAL

## 2023-06-01 ENCOUNTER — PATIENT MESSAGE (OUTPATIENT)
Dept: ENDOCRINOLOGY CLINIC | Facility: CLINIC | Age: 38
End: 2023-06-01

## 2023-06-01 NOTE — TELEPHONE ENCOUNTER
Contacted patient regarding concern with GI symptoms. Instructed patient to discontinue Ozempic at this time. Will reevaluate on 6/9/2023 scheduled follow up. Can discuss alternative medication regimens at that time. If abdominal symptoms worsen or don't improve over next few days patient to report to ED for further evaluation. Patient verbalizes understanding.     Joslyn DIOR, BC-ADM, [facility]ES

## 2023-06-01 NOTE — TELEPHONE ENCOUNTER
Jesse Dubose, pt states she is having S/E she believes are from increased dose of Ozempic. Has taken increased dose for 4 weeks now. Would you like her to continue on this dose? She has an appt with you next week too.      Future Appointments   Date Time Provider Beckie Singh   6/9/2023 11:15 AM Jesse Wong, DILAN [facility]   8/8/2023  9:00 AM Susie Nieves APRN [facility]

## 2023-06-01 NOTE — TELEPHONE ENCOUNTER
Spoke to pt on phone also. She said she takes injection on Friday PMs, so she wants to know what to do for tomorrow. She does have 2 injections left of the Ozempic. She also wants to discuss possibly changing to Trulicity because she said that is what her mom is on and mom has had no S/E and typically her and mom respond to meds the same way. I told her with her upcoming appt, may need to wait til then to discuss change. Please advise. Thank you.

## 2023-06-05 ENCOUNTER — TELEPHONE (OUTPATIENT)
Dept: ENDOCRINOLOGY CLINIC | Facility: CLINIC | Age: 38
End: 2023-06-05

## 2023-06-05 NOTE — TELEPHONE ENCOUNTER
From: Diane Allen  Sent: 6/5/2023 8:45 AM CDT  To: Eric Barkley, APN  Subject: Upcoming telehealth visit    Attached are my readings.

## 2023-06-08 ENCOUNTER — OFFICE VISIT (OUTPATIENT)
Dept: DERMATOLOGY | Age: 38
End: 2023-06-08

## 2023-06-08 DIAGNOSIS — L21.9 SEBORRHEIC DERMATITIS: ICD-10-CM

## 2023-06-08 DIAGNOSIS — Z12.83 SCREENING EXAM FOR SKIN CANCER: ICD-10-CM

## 2023-06-08 DIAGNOSIS — L71.9 ROSACEA: Primary | ICD-10-CM

## 2023-06-08 PROCEDURE — 99214 OFFICE O/P EST MOD 30 MIN: CPT | Performed by: DERMATOLOGY

## 2023-06-08 RX ORDER — FLUOCINONIDE TOPICAL SOLUTION USP, 0.05% 0.5 MG/ML
SOLUTION TOPICAL
Qty: 120 ML | Refills: 2 | Status: SHIPPED | OUTPATIENT
Start: 2023-06-08

## 2023-06-09 ENCOUNTER — VIRTUAL PHONE E/M (OUTPATIENT)
Dept: ENDOCRINOLOGY CLINIC | Facility: CLINIC | Age: 38
End: 2023-06-09
Payer: COMMERCIAL

## 2023-06-09 DIAGNOSIS — E11.9 TYPE 2 DIABETES MELLITUS WITHOUT COMPLICATION, WITHOUT LONG-TERM CURRENT USE OF INSULIN (HCC): Primary | ICD-10-CM

## 2023-06-09 PROCEDURE — G2012 BRIEF CHECK IN BY MD/QHP: HCPCS | Performed by: NURSE PRACTITIONER

## 2023-06-09 RX ORDER — TIRZEPATIDE 2.5 MG/.5ML
2.5 INJECTION, SOLUTION SUBCUTANEOUS WEEKLY
Qty: 2 ML | Refills: 0 | Status: SHIPPED | OUTPATIENT
Start: 2023-06-09

## 2023-07-14 ENCOUNTER — VIRTUAL PHONE E/M (OUTPATIENT)
Dept: ENDOCRINOLOGY CLINIC | Facility: CLINIC | Age: 38
End: 2023-07-14
Payer: COMMERCIAL

## 2023-07-14 DIAGNOSIS — E11.9 TYPE 2 DIABETES MELLITUS WITHOUT COMPLICATION, WITHOUT LONG-TERM CURRENT USE OF INSULIN (HCC): Primary | ICD-10-CM

## 2023-07-14 PROCEDURE — G2012 BRIEF CHECK IN BY MD/QHP: HCPCS | Performed by: NURSE PRACTITIONER

## 2023-07-14 RX ORDER — TIRZEPATIDE 5 MG/.5ML
5 INJECTION, SOLUTION SUBCUTANEOUS WEEKLY
Qty: 2 ML | Refills: 0 | Status: SHIPPED | OUTPATIENT
Start: 2023-07-14

## 2023-07-14 NOTE — PROGRESS NOTES
HPI:   Kusum Denson is a 34year old female who presents virtually for her management of diabetes. This visit is conducted using Telemedicine with live, interactive audio. Patient verbally consents to Telemedicine visit. Patient understands and accepts financial responsibility for any deductible, co-insurance and/or co-pays associated with this service. Diabetes: stable, at goal  Type: 2   Duration:dx May 2021  Current Meds: Metformin 500mg po daily, Mounjaro 2.5mg injection weekly  SE: adverse GI effects with increased dose of Metformin  Long term insulin use: n/a  Failed Meds: Ozempic - severe GI effects    Complications: none    Testing with: [employer] Contour/Nilsa 3 CGM  Monitoring blood glucose: daily  Average glucose readin mg/dl  Hypoglycemia frequency:  denies     Overall glucose control:   HGBA1C:    Lab Results   Component Value Date    A1C 6.3 (H) 2023    A1C 6.3 2023    A1C 6.6 2022     2020          Past History:   She  has a past medical history of Anxiety and depression (2013), BACK PAIN, Bronchitis, Diabetes [address] [address] Hirsutism, History of blood transfusion, Hypercholesterolemia, Hyperlipidemia, LGA (large for gestational age) fetus (2015), Personal history of malignant melanoma of skin (2015), Post partum depression, Postpartum depression, postpartum condition (2013), and Sinusitis. Her family history includes Alcohol and Other Disorders Associated in her maternal grandfather and maternal grandmother; Breast Cancer (age of onset: 39) in her paternal grandmother; Cancer in her maternal grandfather, maternal grandmother, and paternal grandmother; Depression in her brother and mother; Diabetes in her mother; Heart Disease in her paternal grandfather; Obesity in her brother. She  reports that she has never smoked. She has never used smokeless tobacco. She reports that she does not drink alcohol and does not use drugs.      She is allergic to azithromycin, sulfa antibiotics, sulfa drugs cross reactors, and zoloft [sertraline hcl]. [DISCONTINUED] Tirzepatide [facility]) 2.5 MG/0.5ML Subcutaneous Solution Pen-injector, Inject 2.5 mg into the skin once a week. Continuous Blood Gluc Sensor (FREESTYLE TINO 3 SENSOR) Does not apply Misc, 1 each every 14 (fourteen) days. metFORMIN 500 MG Oral Tab, Take 1 tablet (500 mg total) by mouth daily with breakfast.  Azelaic Acid 15 % External Gel, APPLY PEA SIZED AMOUNT TOPICALLY TO ENTIRE FACE TWICE DAILY WITH MOISTURIZER  escitalopram 10 MG Oral Tab, Take 1 tablet (10 mg total) by mouth nightly. fluticasone propionate 50 MCG/ACT Nasal Suspension, 2 sprays by Nasal route daily. AUVI-Q 0.3 MG/0.3ML Injection Solution Auto-injector, Inject 0.3 mL (1 each total) as directed as needed. NALTREXONE HCL OR, Take 4.5 mg by mouth daily. Levocetirizine Dihydrochloride 5 MG Oral Tab, Take 1 tablet (5 mg total) by mouth every evening. No current facility-administered medications on file prior to visit. ASSESSMENT AND PLAN:   Diabetes:   Patient to continue Metformin 500mg daily and increase Mounjaro to 5mg injection weekly. Discussed self monitoring blood glucose and the importance of monitoring. Patient agreed to monitoring daily - alternating fasting in AM and 2 hours postprandial of one meal per day. Reinforced healthy eating in healthy portions and increasing daily physical activity. The patient indicates understanding of these issues and agrees to the plan. Refills addressed at time of office visit. Diagnoses and all orders for this visit:    Type 2 diabetes mellitus without complication, without long-term current use of insulin (HCC)  -     Tirzepatide (MOUNJARO) 5 MG/0.5ML Subcutaneous Solution Pen-injector; Inject 5 mg into the skin once a week. -     HEMOGLOBIN A1C         Return in about 4 weeks (around 8/11/2023) for 30 minute appointment, Telehealth Visit.     The risks and benefits of my recommendations, as well as other treatment options were discussed with the patient today. questions were answered to the best of my knowledge. Patient verbalizes understanding and amendable to plan of care.   Duration of this service:  6 minutes   Desire DIOR, BC-ADM, [facility]

## 2023-08-02 ENCOUNTER — TELEPHONE (OUTPATIENT)
Dept: INTERNAL MEDICINE CLINIC | Facility: CLINIC | Age: 38
End: 2023-08-02

## 2023-08-02 NOTE — TELEPHONE ENCOUNTER
Patient returned the call, labs had been ordered by her Functional MD and copies were sent by Lilia Epley to that MD and our office. Pt states all the labs have been addressed by her Functional MD.  Pt reminded that she has not seen anyone at our office since 2021 and encouraged pt to schedule an appt when she can. Labs sent to scan. FYI to TB.

## 2023-08-02 NOTE — TELEPHONE ENCOUNTER
LM for pt at [phone] (M) vm to call back regarding [facility] results received dated 3/13/23. Lab results in [address] Nurse Only [address]. Original given to Shila GRACIA in DM to discuss with Jillian BRANDON APN for pt's future appt on 8/11/23.

## 2023-08-06 LAB — HEMOGLOBIN A1C: 6.1 % OF TOTAL HGB

## 2023-08-10 ENCOUNTER — TELEPHONE (OUTPATIENT)
Dept: ENDOCRINOLOGY CLINIC | Facility: CLINIC | Age: 38
End: 2023-08-10

## 2023-08-11 ENCOUNTER — PATIENT MESSAGE (OUTPATIENT)
Dept: ENDOCRINOLOGY CLINIC | Facility: CLINIC | Age: 38
End: 2023-08-11

## 2023-08-11 ENCOUNTER — VIRTUAL PHONE E/M (OUTPATIENT)
Dept: ENDOCRINOLOGY CLINIC | Facility: CLINIC | Age: 38
End: 2023-08-11
Payer: COMMERCIAL

## 2023-08-11 DIAGNOSIS — E11.9 TYPE 2 DIABETES MELLITUS WITHOUT COMPLICATION, WITHOUT LONG-TERM CURRENT USE OF INSULIN (HCC): ICD-10-CM

## 2023-08-11 DIAGNOSIS — E11.9 TYPE 2 DIABETES MELLITUS WITHOUT COMPLICATION, WITHOUT LONG-TERM CURRENT USE OF INSULIN (HCC): Primary | ICD-10-CM

## 2023-08-11 PROCEDURE — 95251 CONT GLUC MNTR ANALYSIS I&R: CPT | Performed by: NURSE PRACTITIONER

## 2023-08-11 PROCEDURE — G2012 BRIEF CHECK IN BY MD/QHP: HCPCS | Performed by: NURSE PRACTITIONER

## 2023-08-11 RX ORDER — TIRZEPATIDE 7.5 MG/.5ML
7.5 INJECTION, SOLUTION SUBCUTANEOUS WEEKLY
Qty: 2 ML | Refills: 1 | Status: SHIPPED | OUTPATIENT
Start: 2023-08-11 | End: 2023-08-14

## 2023-08-11 NOTE — PROGRESS NOTES
HPI:   Marielos Buckner is a 34year old female who presents virtually for her management of diabetes. This visit is conducted using Telemedicine with live, interactive audio. Patient verbally consents to Telemedicine visit. Patient understands and accepts financial responsibility for any deductible, co-insurance and/or co-pays associated with this service. Diabetes: stable, at goal  Type: 2   Duration:dx May 2021  Current Meds: Metformin 500mg po daily, Mounjaro 5mg injection weekly  SE: diarrhea with increased doses of Metformin  Long term insulin use: n/a  Failed Meds: Ozempic - severe GI effects    Complications: none    Personal Freestyle Nilsa 3 CGM  Analysis of data: 7/28/2023 - 8/10/2023  % Time CGM is Active: 42%  Sensor download: full report  in media  Average glucose : 112 mg/dl     CV (coefficient of variation) : 16.5%     0% time above 180mg /dl   0% time above 250 mg/dl  100% time in target range:  mg/dl   0% time below target range: 70mg/dl     Evaluation   1. Baseline glucose stable and in target range  2. Minimal postprandial elevation  3. Low likelihood of hypoglycemia  4. Minimal glucose variability         Overall glucose control:   HGBA1C:    Lab Results   Component Value Date    A1C 6.1 (H) 08/05/2023    A1C 6.1 08/05/2023    A1C 6.3 (H) 04/20/2023     06/16/2020          Past History:   She  has a past medical history of Anxiety and depression (06/28/2013), BACK PAIN, Bronchitis, Diabetes ([address] Hirsutism, History of blood transfusion, Hypercholesterolemia, Hyperlipidemia, LGA (large for gestational age) fetus (03/17/2015), Personal history of malignant melanoma of skin (04/27/2015), Post partum depression, Postpartum depression, postpartum condition (06/28/2013), and Sinusitis.    Her family history includes Alcohol and Other Disorders Associated in her maternal grandfather and maternal grandmother; Breast Cancer (age of onset: 39) in her paternal grandmother; Cancer in her maternal grandfather, maternal grandmother, and paternal grandmother; Depression in her brother and mother; Diabetes in her mother; Heart Disease in her paternal grandfather; Obesity in her brother. She  reports that she has never smoked. She has never used smokeless tobacco. She reports that she does not drink alcohol and does not use drugs. She is allergic to azithromycin, sulfa antibiotics, sulfa drugs cross reactors, and zoloft [sertraline hcl]. escitalopram 5 MG Oral Tab, Take 1 tablet (5 mg total) by mouth nightly for 14 days, THEN 0.5 tablets (2.5 mg total) nightly for 14 days. [DISCONTINUED] Tirzepatide [facility]) 5 MG/0.5ML Subcutaneous Solution Pen-injector, Inject 5 mg into the skin once a week. Continuous Blood Gluc Sensor (beSUCCESSSTYLE TINO 3 SENSOR) Does not apply Misc, 1 each every 14 (fourteen) days. Azelaic Acid 15 % External Gel, APPLY PEA SIZED AMOUNT TOPICALLY TO ENTIRE FACE TWICE DAILY WITH MOISTURIZER  fluticasone propionate 50 MCG/ACT Nasal Suspension, 2 sprays by Nasal route daily. AUVI-Q 0.3 MG/0.3ML Injection Solution Auto-injector, Inject 0.3 mL (1 each total) as directed as needed. NALTREXONE HCL OR, Take 4.5 mg by mouth daily. Levocetirizine Dihydrochloride 5 MG Oral Tab, Take 1 tablet (5 mg total) by mouth every evening. No current facility-administered medications on file prior to visit. ASSESSMENT AND PLAN:   Diabetes:   Patient to increase Mounjaro to 7.5 mg injection weekly and discontinue Metformin. Discussed self monitoring blood glucose and the importance of monitoring. Patient agreed to monitoring daily - alternating fasting in AM and 2 hours postprandial of one meal per day. Reinforced healthy eating in healthy portions and increasing daily physical activity. The patient indicates understanding of these issues and agrees to the plan. Refills addressed at time of office visit.     Diagnoses and all orders for this visit:    Type 2 diabetes mellitus without complication, without long-term current use of insulin (HCC)  -     Tirzepatide (MOUNJARO) 7.5 MG/0.5ML Subcutaneous Solution Pen-injector; Inject 7.5 mg into the skin once a week. -     GLUC MNTR CONT REC FROM NTRSTL TISS FLU PHYS I&R           Return in about 9 weeks (around 10/13/2023) for 30 minute appointment, Personal CGM, Telehealth Visit. The risks and benefits of my recommendations, as well as other treatment options were discussed with the patient today. questions were answered to the best of my knowledge. Patient verbalizes understanding and amendable to plan of care.   Duration of this service: 6 minutes   Desire IDOR, BC-ADM, [facility]

## 2023-08-14 RX ORDER — TIRZEPATIDE 7.5 MG/.5ML
7.5 INJECTION, SOLUTION SUBCUTANEOUS WEEKLY
Qty: 6 ML | Refills: 1 | Status: SHIPPED | OUTPATIENT
Start: 2023-08-14

## 2023-08-14 NOTE — TELEPHONE ENCOUNTER
Received a fax with request for Mounjaro from [employer] - per pt's message, pended med    Requested Prescriptions     Pending Prescriptions Disp Refills    Tirzepatide (MOUNJARO) 7.5 MG/0.5ML Subcutaneous Solution Pen-injector 2 mL 2     Sig: Inject 7.5 mg into the skin once a week. Your appointments       Date & Time Appointment Department [facility])    Oct 03, 2023  4:30 PM CDT Video Visit with BARBY [address] ([address])    Please verify your telehealth insurance benefits prior to your appointment. You must be in the state of [place] during the virtual visit. Please use the [employer] Brady and launch the video visit 10 minutes prior to your scheduled appointment time to ensure your camera and microphone are working properly. Once the video visit has started you will be placed in a waiting room until the provider begins the visit. You will receive an email confirmation with instructions. If you have questions, call your doctor's office directly. If you are having issues or need to use a desktop/laptop, please follow the below steps:        1. Close out all other open apps (could be competing for audio resources)  2. Disable Bluetooth  3.       Reboot mobile device before joining the video  4. Come off Wi-Fi and switch over to Data    Please see our Video Visit Tip Sheet if you need additional assistance. If you believe this is an emergency, please dial 911 immediately. Oct 13, 2023  8:00 AM CDT [facility] [address] [facility] ([facility])    You have been scheduled for a Virtual Telephone visit with your provider. Please be available at your phone so that your physician can contact you, and be prepared with any questions or concerns. You may be billed a copay at a later time, depending upon your insurance.   As always, your health is our priority. [facility], [facility] [address], [place]  [facility] [place] [id number]  [facility] [address]  [address] [facility] [address] [phone]          Last A1c value was 6.1% done 8/5/2023.     Refill 8/11/23  LOV 8/11/23 telemed

## 2023-10-13 ENCOUNTER — VIRTUAL PHONE E/M (OUTPATIENT)
Dept: ENDOCRINOLOGY CLINIC | Facility: CLINIC | Age: 38
End: 2023-10-13
Payer: COMMERCIAL

## 2023-10-13 DIAGNOSIS — E78.2 MIXED HYPERLIPIDEMIA: ICD-10-CM

## 2023-10-13 DIAGNOSIS — E11.9 TYPE 2 DIABETES MELLITUS WITHOUT COMPLICATION, WITHOUT LONG-TERM CURRENT USE OF INSULIN (HCC): Primary | ICD-10-CM

## 2023-10-13 PROCEDURE — 95251 CONT GLUC MNTR ANALYSIS I&R: CPT | Performed by: NURSE PRACTITIONER

## 2023-10-13 PROCEDURE — G2012 BRIEF CHECK IN BY MD/QHP: HCPCS | Performed by: NURSE PRACTITIONER

## 2023-10-13 RX ORDER — TIRZEPATIDE 10 MG/.5ML
10 INJECTION, SOLUTION SUBCUTANEOUS WEEKLY
Qty: 6 ML | Refills: 1 | Status: SHIPPED | OUTPATIENT
Start: 2023-10-13

## 2023-10-13 NOTE — PROGRESS NOTES
HPI:   Franchesca Castro is a 34year old female who presents virtually for her management of diabetes. This visit is conducted using Telemedicine with live, interactive audio. Patient verbally consents to Telemedicine visit. Patient understands and accepts financial responsibility for any deductible, co-insurance and/or co-pays associated with this service. Diabetes: stable, at goal  Type: 2   Duration:dx May 2021  Current Meds: Mounjaro 7.5mg injection weekly  SE: denies  Long term insulin use: n/a  Failed Meds: Ozempic - severe GI effects, Metformin - diarrhea    Complications: none    Personal Freestyle Nilsa 3 CGM  Analysis of data: 9/29/2023 - 10/12/2023  % Time CGM is Active: 89%  Sensor download: full report  in media  Average glucose : 107 mg/dl     CV (coefficient of variation) : 14.2%     0% time above 180mg /dl   0% time above 250 mg/dl  100% time in target range:  mg/dl   0% time below target range: 70mg/dl     Evaluation   1. Baseline glucose stable and in target range  2. Minimal postprandial elevation  3. Low likelihood of hypoglycemia  4. Minimal glucose variability         Overall glucose control:   HGBA1C:    Lab Results   Component Value Date    A1C 6.1 (H) 08/05/2023    A1C 6.1 08/05/2023    A1C 6.3 (H) 04/20/2023     06/16/2020          Past History:   She  has a past medical history of Anxiety and depression (06/28/2013), BACK PAIN, Bronchitis, Diabetes ([address] Hirsutism, History of blood transfusion, Hypercholesterolemia, Hyperlipidemia, LGA (large for gestational age) fetus (03/17/2015), Personal history of malignant melanoma of skin (04/27/2015), Post partum depression, Postpartum depression, postpartum condition (06/28/2013), and Sinusitis.    Her family history includes Alcohol and Other Disorders Associated in her maternal grandfather and maternal grandmother; Breast Cancer (age of onset: 39) in her paternal grandmother; Cancer in her maternal grandfather, maternal grandmother, and paternal grandmother; Depression in her brother and mother; Diabetes in her mother; Heart Disease in her paternal grandfather; Obesity in her brother. She  reports that she has never smoked. She has never used smokeless tobacco. She reports that she does not drink alcohol and does not use drugs. She is allergic to azithromycin, sulfa antibiotics, sulfa drugs cross reactors, and zoloft [sertraline hcl]. escitalopram 5 MG/5ML Oral Solution, Take 10 mL (10 mg total) by mouth daily for 30 days, THEN 9 mL (9 mg total) daily for 30 days, THEN 8 mL (8 mg total) daily. [DISCONTINUED] Tirzepatide (MOUNJARO) 7.5 MG/0.5ML Subcutaneous Solution Pen-injector, Inject 7.5 mg into the skin once a week. Continuous Blood Gluc Sensor (FREESTYLE TINO 3 SENSOR) Does not apply Misc, 1 each every 14 (fourteen) days. Azelaic Acid 15 % External Gel, APPLY PEA SIZED AMOUNT TOPICALLY TO ENTIRE FACE TWICE DAILY WITH MOISTURIZER  fluticasone propionate 50 MCG/ACT Nasal Suspension, 2 sprays by Nasal route daily. AUVI-Q 0.3 MG/0.3ML Injection Solution Auto-injector, Inject 0.3 mL (1 each total) as directed as needed. NALTREXONE HCL OR, Take 4.5 mg by mouth daily. Levocetirizine Dihydrochloride 5 MG Oral Tab, Take 1 tablet (5 mg total) by mouth every evening. No current facility-administered medications on file prior to visit. ASSESSMENT AND PLAN:   Diabetes:   Patient to continue Mounjaro 7.5 mg injection weekly. Patient to continue to use personal Freestyle Tino CGM for glucose monitoring. Discussed self monitoring blood glucose and the importance of monitoring. Patient agreed to monitoring daily - alternating fasting and 2 hours postprandial of one meal per day if not using CGM. Reinforced healthy eating in healthy portions and increasing daily physical activity. The patient indicates understanding of these issues and agrees to the plan. Refills addressed at time of office visit.     Diagnoses and all orders for this visit:    Type 2 diabetes mellitus without complication, without long-term current use of insulin (HCC)  -     Tirzepatide (MOUNJARO) 10 MG/0.5ML Subcutaneous Solution Pen-injector; Inject 10 mg into the skin once a week. -     COMP METABOLIC PANEL [52262] [Q]  -     HGB A1C [496] [Q]  -     MICROALB/CREAT RATIO, RANDOM URINE [6317] [Q]  -     Interpretation code 72 hour glucose monitor    Mixed hyperlipidemia  -     LIPID PANEL [8030] [Q]       Return in about 3 months (around 1/13/2024) for 45 minute appointment, Personal CGM. The risks and benefits of my recommendations, as well as other treatment options were discussed with the patient today. questions were answered to the best of my knowledge. Patient verbalizes understanding and amendable to plan of care.   Duration of this service: 8 minutes   Desire DIOR, BC-ADM, [facility]

## 2023-10-26 ENCOUNTER — E-VISIT (OUTPATIENT)
Dept: TELEHEALTH | Age: 38
End: 2023-10-26

## 2023-10-26 DIAGNOSIS — Z02.9 ADMINISTRATIVE ENCOUNTER: Primary | ICD-10-CM

## 2023-10-26 NOTE — PROGRESS NOTES
I was reviewing patient E-Visit and about to respond when patient cancelled the E-visit and reports she is going to conduct VV instead.

## 2023-11-04 ENCOUNTER — PATIENT MESSAGE (OUTPATIENT)
Dept: ENDOCRINOLOGY CLINIC | Facility: CLINIC | Age: 38
End: 2023-11-04

## 2023-11-06 NOTE — TELEPHONE ENCOUNTER
Contacted patient to discuss concerns. Discussed elevated insulin levels with diabetes. Recommend continuing current medication regimen, 2 week glucose average 109 mg/dl. Decreasing carbohydrate intake and increasing physical activity. Patient verbalizes understanding and has no further questions at this time.     Willem DIOR, BC-ADM, [facility]

## 2023-11-29 ENCOUNTER — V-VISIT (OUTPATIENT)
Dept: URGENT CARE | Age: 38
End: 2023-11-29

## 2023-11-29 ENCOUNTER — TELEPHONE (OUTPATIENT)
Dept: OTHER | Age: 38
End: 2023-11-29

## 2023-11-29 VITALS
HEART RATE: 97 BPM | BODY MASS INDEX: 33.33 KG/M2 | OXYGEN SATURATION: 97 % | TEMPERATURE: 96.7 F | HEIGHT: 69 IN | DIASTOLIC BLOOD PRESSURE: 86 MMHG | SYSTOLIC BLOOD PRESSURE: 126 MMHG | RESPIRATION RATE: 16 BRPM | WEIGHT: 225 LBS

## 2023-11-29 DIAGNOSIS — J20.8 ACUTE BRONCHITIS, VIRAL: Primary | ICD-10-CM

## 2023-11-29 DIAGNOSIS — J20.8 ACUTE BRONCHITIS, VIRAL: ICD-10-CM

## 2023-11-29 PROCEDURE — 99213 OFFICE O/P EST LOW 20 MIN: CPT | Performed by: NURSE PRACTITIONER

## 2023-11-29 RX ORDER — FLUTICASONE PROPIONATE 44 UG/1
2 AEROSOL, METERED RESPIRATORY (INHALATION) 2 TIMES DAILY
Qty: 10.6 G | Refills: 0 | Status: CANCELLED | OUTPATIENT
Start: 2023-11-29

## 2023-11-29 RX ORDER — BENZONATATE 200 MG/1
CAPSULE ORAL
COMMUNITY
Start: 2023-11-22

## 2023-11-29 RX ORDER — TIRZEPATIDE 10 MG/.5ML
INJECTION, SOLUTION SUBCUTANEOUS
COMMUNITY
Start: 2023-10-13

## 2023-11-29 RX ORDER — ALBUTEROL SULFATE 90 UG/1
AEROSOL, METERED RESPIRATORY (INHALATION)
COMMUNITY
Start: 2023-11-22

## 2023-11-29 RX ORDER — FLUTICASONE PROPIONATE 44 MCG
2 AEROSOL WITH ADAPTER (GRAM) INHALATION 2 TIMES DAILY
Qty: 10.6 G | Refills: 0 | Status: SHIPPED | OUTPATIENT
Start: 2023-11-29

## 2023-11-29 ASSESSMENT — ENCOUNTER SYMPTOMS
SINUS PAIN: 0
WHEEZING: 1
FEVER: 0
SHORTNESS OF BREATH: 0
TROUBLE SWALLOWING: 0
COUGH: 1
SORE THROAT: 0
CHILLS: 0
HEADACHES: 0
SINUS PRESSURE: 0

## 2023-12-08 ENCOUNTER — APPOINTMENT (OUTPATIENT)
Dept: URGENT CARE | Age: 38
End: 2023-12-08

## 2023-12-21 DIAGNOSIS — E11.9 TYPE 2 DIABETES MELLITUS WITHOUT COMPLICATION, WITHOUT LONG-TERM CURRENT USE OF INSULIN (HCC): ICD-10-CM

## 2023-12-22 RX ORDER — BLOOD-GLUCOSE SENSOR
1 EACH MISCELLANEOUS
Qty: 6 EACH | Refills: 3 | Status: SHIPPED | OUTPATIENT
Start: 2023-12-22

## 2023-12-22 NOTE — TELEPHONE ENCOUNTER
Requested Prescriptions     Pending Prescriptions Disp Refills    FREESTYLE [facility] 3 SENSOR Does not apply [address] [Pharmacy Med Name: FREESTYLE LIBRE_3 SENSOR] 6 each 3     Sig: USE 1 SENSOR EVERY 14 DAYS     Your appointments       Date & Time Appointment Department [facility])    Jan 04, 2024  9:30 AM CST Diabetes Pump follow up with BARBY Lombardi [facility], [facility] ([facility] [place])        Feb 08, 2024  4:30 PM CST Video Visit with Scott Mcclelland [address] [facility])    Please verify your telehealth insurance benefits prior to your appointment. You must be in the state of [place] during the virtual visit. Please use the "[employer] Mobile Brady and launch the video visit 10 minutes prior to your scheduled appointment time to ensure your camera and microphone are working properly. Once the video visit has started you will be placed in a waiting room until the provider begins the visit. You will receive an email confirmation with instructions. If you have questions, call your doctor's office directly. If you are having issues or need to use a desktop/laptop, please follow the below steps:        1. Close out all other open apps (could be competing for audio resources)  2. Disable Bluetooth  3.       Reboot mobile device before joining the video  4. Come off Wi-Fi and switch over to Data    Please see our Video Visit Tip Sheet if you need additional assistance. If you believe this is an emergency, please dial 911 immediately. [facility], [facility]  [facility] [place]  [address] [facility] [address]  [facility]  [facility]  [address] [facility] [address] [phone]          Last A1c value was 6.1% done 8/5/2023.     Refill 4/20/23  LOV 10/13/23 virtual

## 2023-12-31 LAB
ALBUMIN/GLOBULIN RATIO: 1.8 (CALC) (ref 1–2.5)
ALBUMIN: 4.4 G/DL (ref 3.6–5.1)
ALKALINE PHOSPHATASE: 64 U/L (ref 31–125)
ALT: 59 U/L (ref 6–29)
AST: 39 U/L (ref 10–30)
BILIRUBIN, TOTAL: 0.5 MG/DL (ref 0.2–1.2)
BUN: 10 MG/DL (ref 7–25)
CALCIUM: 9.5 MG/DL (ref 8.6–10.2)
CARBON DIOXIDE: 24 MMOL/L (ref 20–32)
CHLORIDE: 105 MMOL/L (ref 98–110)
CHOL/HDLC RATIO: 7.3 (CALC)
CHOLESTEROL, TOTAL: 279 MG/DL
CREATININE, RANDOM URINE: 236 MG/DL (ref 20–275)
CREATININE: 0.79 MG/DL (ref 0.5–0.99)
EGFR: 97 ML/MIN/1.73M2
GLOBULIN: 2.5 G/DL (CALC) (ref 1.9–3.7)
GLUCOSE: 99 MG/DL (ref 65–99)
HDL CHOLESTEROL: 38 MG/DL
HEMOGLOBIN A1C: 5.3 % OF TOTAL HGB
LDL-CHOLESTEROL: 196 MG/DL (CALC)
MICROALBUMIN/CREATININE RATIO, RANDOM URINE: 5 MCG/MG CREAT
MICROALBUMIN: 1.1 MG/DL
NON-HDL CHOLESTEROL: 241 MG/DL (CALC)
POTASSIUM: 4.7 MMOL/L (ref 3.5–5.3)
PROTEIN, TOTAL: 6.9 G/DL (ref 6.1–8.1)
SODIUM: 138 MMOL/L (ref 135–146)
TRIGLYCERIDES: 254 MG/DL

## 2024-01-04 ENCOUNTER — OFFICE VISIT (OUTPATIENT)
Dept: ENDOCRINOLOGY CLINIC | Facility: CLINIC | Age: 39
End: 2024-01-04
Payer: COMMERCIAL

## 2024-01-04 VITALS
BODY MASS INDEX: 34 KG/M2 | WEIGHT: 228 LBS | RESPIRATION RATE: 17 BRPM | DIASTOLIC BLOOD PRESSURE: 62 MMHG | SYSTOLIC BLOOD PRESSURE: 130 MMHG | HEART RATE: 91 BPM | OXYGEN SATURATION: 98 %

## 2024-01-04 DIAGNOSIS — E66.09 CLASS 1 OBESITY DUE TO EXCESS CALORIES WITH SERIOUS COMORBIDITY AND BODY MASS INDEX (BMI) OF 33.0 TO 33.9 IN ADULT: ICD-10-CM

## 2024-01-04 DIAGNOSIS — E11.9 TYPE 2 DIABETES MELLITUS WITHOUT COMPLICATION, WITHOUT LONG-TERM CURRENT USE OF INSULIN (HCC): Primary | ICD-10-CM

## 2024-01-04 DIAGNOSIS — E78.2 MIXED HYPERLIPIDEMIA: ICD-10-CM

## 2024-01-04 PROCEDURE — 3078F DIAST BP <80 MM HG: CPT | Performed by: NURSE PRACTITIONER

## 2024-01-04 PROCEDURE — 99214 OFFICE O/P EST MOD 30 MIN: CPT | Performed by: NURSE PRACTITIONER

## 2024-01-04 PROCEDURE — 95251 CONT GLUC MNTR ANALYSIS I&R: CPT | Performed by: NURSE PRACTITIONER

## 2024-01-04 PROCEDURE — 3075F SYST BP GE 130 - 139MM HG: CPT | Performed by: NURSE PRACTITIONER

## 2024-01-04 RX ORDER — ATORVASTATIN CALCIUM 20 MG/1
20 TABLET, FILM COATED ORAL NIGHTLY
Qty: 90 TABLET | Refills: 3 | Status: SHIPPED | OUTPATIENT
Start: 2024-01-04

## 2024-01-04 RX ORDER — ANASTROZOLE 1 MG/1
TABLET ORAL
COMMUNITY
Start: 2023-11-21

## 2024-01-04 RX ORDER — BLOOD-GLUCOSE SENSOR
1 EACH MISCELLANEOUS
Qty: 6 EACH | Refills: 3 | Status: SHIPPED | OUTPATIENT
Start: 2024-01-04

## 2024-01-04 RX ORDER — TIRZEPATIDE 10 MG/.5ML
10 INJECTION, SOLUTION SUBCUTANEOUS WEEKLY
Qty: 6 ML | Refills: 1 | Status: SHIPPED | OUTPATIENT
Start: 2024-01-04

## 2024-01-04 NOTE — PROGRESS NOTES
HPI:   Mirna Allen is a 38 year old female who presents for management of her diabetes.        Chief Complaint   Patient presents with    Diabetes     Follow up (nilsa)        Diabetes: stable, at goal  Type: 2   Duration: dx May 2021  Current Meds: Mounjaro 10mg injection weekly   SE: denies  Long term insulin use: n/a  Failed Meds: Metformin - diarrhea, Ozempic - severe GI effects  Complications: None    Personal Freestyle Nilsa 3 CGM  Analysis of data: 2023 - 1/3/2024  % Time CGM is Active: 97%  Sensor download: full report  in media  Average glucose : 107 mg/dl     CV (coefficient of variation) : 11.6%     0% time above 180mg /dl   0% time above 250 mg/dl  100% time in target range:  mg/dl   0% time below target range: 70mg/dl     Evaluation   1. Baseline glucose stable and in target range  2. Minimal postprandial elevations  3. Low likelihood of hypoglycemia  4. Minimal glucose variability         Overall glucose control:   HGBA1C:    Lab Results   Component Value Date    A1C 5.3 2023    A1C 6.1 (H) 2023    A1C 6.1 2023     2020       Hypertension: stable   Blood Pressure: 130/62   Medication prescribed: none  SE: n/a     Hyperlipidemia: needs improvement  LDL: 196   Tri  Medication prescribed: none  SE: n/a     Wt Readings from Last 3 Encounters:   24 228 lb (103.4 kg)   23 255 lb (115.7 kg)   23 225 lb (102.1 kg)     BP Readings from Last 3 Encounters:   24 130/62   23 116/80   23 (!) 137/91          Past History:   She  has a past medical history of Anxiety and depression (2013), BACK PAIN, Bronchitis, Diabetes (HCC), Hirsutism, History of blood transfusion, Hypercholesterolemia, Hyperlipidemia, LGA (large for gestational age) fetus (2015), Personal history of malignant melanoma of skin (2015), Post partum depression, Postpartum depression, postpartum condition (2013), and Sinusitis.   Her family  history includes Alcohol and Other Disorders Associated in her maternal grandfather and maternal grandmother; Breast Cancer (age of onset: 45) in her paternal grandmother; Cancer in her maternal grandfather, maternal grandmother, and paternal grandmother; Depression in her brother and mother; Diabetes in her mother; Heart Disease in her paternal grandfather; Obesity in her brother.   She  reports that she has never smoked. She has never used smokeless tobacco. She reports that she does not drink alcohol and does not use drugs.     She is allergic to azithromycin, sulfa antibiotics, sulfa drugs cross reactors, and zoloft [sertraline hcl].     Current Outpatient Medications on File Prior to Visit   Medication Sig    anastrozole 1 MG Oral Tab tab TAKE HALF A TABLET BY MOUTH TWICE WEEKLY    NALTREXONE HCL OR Take 4.5 mg by mouth daily.    Levocetirizine Dihydrochloride 5 MG Oral Tab Take 1 tablet (5 mg total) by mouth every evening.    [DISCONTINUED] Continuous Blood Gluc Sensor (EverstringSTYLE TINO 3 SENSOR) Does not apply Misc 1 Device every 14 (fourteen) days.    [DISCONTINUED] Tirzepatide (MOUNJARO) 10 MG/0.5ML Subcutaneous Solution Pen-injector Inject 10 mg into the skin once a week.    fluticasone propionate 50 MCG/ACT Nasal Suspension 2 sprays by Nasal route daily.    AUVI-Q 0.3 MG/0.3ML Injection Solution Auto-injector Inject 0.3 mL (1 each total) as directed as needed.     No current facility-administered medications on file prior to visit.       CMP  (most recent labs)   Lab Results   Component Value Date/Time    GLU 99 12/30/2023 10:31 AM    BUN 10 12/30/2023 10:31 AM    CREATSERUM 0.79 12/30/2023 10:31 AM    GFRNAA 90 04/29/2021 01:35 PM    GFRAA 103 04/29/2021 01:35 PM    EGFRCR 97 12/30/2023 10:31 AM    CA 9.5 12/30/2023 10:31 AM    ALKPHO 64 12/30/2023 10:31 AM    AST 39 (H) 12/30/2023 10:31 AM    ALT 59 (H) 12/30/2023 10:31 AM    BILT 0.5 12/30/2023 10:31 AM    TP 6.9 12/30/2023 10:31 AM    ALB 4.4  12/30/2023 10:31 AM     12/30/2023 10:31 AM    K 4.7 12/30/2023 10:31 AM     12/30/2023 10:31 AM    CO2 24 12/30/2023 10:31 AM           Lipids  (most recent labs)   Lab Results   Component Value Date/Time    CHOLEST 279 (H) 12/30/2023 10:31 AM    TRIG 254 (H) 12/30/2023 10:31 AM    HDL 38 (L) 12/30/2023 10:31 AM     (H) 12/30/2023 10:31 AM    NONHDLC 241 (H) 12/30/2023 10:31 AM          Diabetes  (most recent labs)   Lab Results   Component Value Date/Time    A1C 5.3 12/30/2023 10:31 AM          Microalb (most recent labs)   Lab Results   Component Value Date/Time    MICROALBUMIN 1.1 12/30/2023 10:31 AM    MICROALBCREA 5.2 08/10/2021 04:41 PM        Lab results reviewed with patient.    REVIEW OF SYSTEMS:   GENERAL HEALTH: feels well otherwise  SKIN: denies any unusual skin lesions or rashes  RESPIRATORY: denies shortness of breath with exertion  CARDIOVASCULAR: denies chest pain on exertion  GI: denies nausea, abdominal pain or heartburn  NEURO: denies headaches and denies numbness and tingling to extremities  ENDO: denies polydipsia, polyuria or polyphagia, denies unexplained weight changes    EXAM:   /62   Pulse 91   Resp 17   Wt 228 lb (103.4 kg)   LMP 03/02/2023   SpO2 98%   BMI 33.67 kg/m²  Estimated body mass index is 33.67 kg/m² as calculated from the following:    Height as of 3/27/23: 5' 9\" (1.753 m).    Weight as of this encounter: 228 lb (103.4 kg).   Physical Exam  Vitals reviewed.   Constitutional:       Appearance: Normal appearance.   Cardiovascular:      Pulses:           Dorsalis pedis pulses are 2+ on the right side and 2+ on the left side.   Pulmonary:      Effort: Pulmonary effort is normal.   Feet:      Right foot:      Protective Sensation: 5 sites tested.  5 sites sensed.      Skin integrity: Skin integrity normal.      Toenail Condition: Right toenails are normal.      Left foot:      Protective Sensation: 5 sites tested.  5 sites sensed.      Skin integrity:  Skin integrity normal.      Toenail Condition: Left toenails are normal.      Comments: Diabetes foot exam performed: Vibration to dorsum to the first toe perceived bilaterally.  Foot care practices reviewed with patient.    Neurological:      Mental Status: She is alert and oriented to person, place, and time.   Psychiatric:         Mood and Affect: Mood normal.         Behavior: Behavior normal.         ASSESSMENT AND PLAN:   As for her Diabetes, it is well controlled, stable, no significant medication side effects noted.     Recommendations are: continue present meds, lose weight by increased dietary compliance and exercise, see ophthalmology soon, check feet daily, and will check labs as ordered.    Patient to continue Mounjaro 10mg injection weekly    Patient to continue to use personal Freestyle Nilsa 3 CGM for glucose monitoring.  Discussed self monitoring blood glucose and the importance of monitoring.  Patient agreed to monitoring bid - fasting and 2 hours postprandial of one meal per day if not using CGM.      Reinforced healthy eating in healthy portions and increasing daily physical activity.       As for her hypertension, Blood Pressure is stable, needs further observation.   PLAN: reviewed diet, exercise and weight control, and labs as ordered     As for her cholesterol, Lipids are needs further observation, needs improvement, needs to follow diet more regularly.   PLAN: reviewed diet, exercise and weight control, and labs as ordered  Discussed ACC/AHA/ADA guidelines for initiation of statin therapy.  Patient amendable to starting statin therapy.  Patient to start atorvastatin 20mg po nightly.      DM Health Maintenance  Last dilated eye exam: due - patient aware  Last diabetic foot exam: Last Foot Exam: 24    Date of last PHQ-2 depression screen: PHQ-2 - Date of last depression screenin2024    Patient  reports that she has never smoked. She has never used smokeless tobacco.  When is flu  vaccine due? Influenza Vaccine(1) due on 10/01/2023  When is pneumonia vaccine due? Pneumococcal Vaccination(1 - PCV) Never done    The patient indicates understanding of these issues and agrees to the plan.  Refills addressed at time of office visit.    Diagnoses and all orders for this visit:    Type 2 diabetes mellitus without complication, without long-term current use of insulin (HCC)  -     Cancel: Microalb/Creat Ratio, Random Urine  -     Tirzepatide (MOUNJARO) 10 MG/0.5ML Subcutaneous Solution Pen-injector; Inject 10 mg into the skin once a week.  -     Continuous Blood Gluc Sensor (FREESTYLE TINO 3 SENSOR) Does not apply Misc; 1 Device every 14 (fourteen) days.  -     COMP METABOLIC PANEL [55388] [Q]  -     HGB A1C [496] [Q]  -     MICROALB/CREAT RATIO, RANDOM URINE [6517] [Q]    Mixed hyperlipidemia  -     atorvastatin 20 MG Oral Tab; Take 1 tablet (20 mg total) by mouth nightly.  -     LIPID PANEL [7600] [Q]    Class 1 obesity due to excess calories with serious comorbidity and body mass index (BMI) of 33.0 to 33.9 in adult       Return in about 6 months (around 7/4/2024) for 45 minute appointment, Personal CGM.    The risks and benefits of my recommendations, as well as other treatment options were discussed with the patient today. Questions were answered to the best of my knowledge.   30 min spent with patient and >50% time spent counseling and coordinating care related to their office visit.      Desire DIOR, BC-ADM, [facility]

## 2024-02-27 ENCOUNTER — TELEPHONE (OUTPATIENT)
Dept: ENDOCRINOLOGY CLINIC | Facility: CLINIC | Age: 39
End: 2024-02-27

## 2024-02-27 NOTE — TELEPHONE ENCOUNTER
Received patient DR exam in [facility] no DR abstracted and emailed scanned to Abril BEAVER MA to have DH sign     Exam date 2/19/24

## 2024-02-27 NOTE — TELEPHONE ENCOUNTER
Reviewed and signed by provider.  Sent to scan.    Desire DIOR, BC-ADM, [facility]

## 2024-03-05 ENCOUNTER — TELEPHONE (OUTPATIENT)
Dept: INTERNAL MEDICINE CLINIC | Facility: CLINIC | Age: 39
End: 2024-03-05

## 2024-03-05 ENCOUNTER — PATIENT OUTREACH (OUTPATIENT)
Dept: CASE MANAGEMENT | Age: 39
End: 2024-03-05

## 2024-03-05 NOTE — PROCEDURES
The office order for PCP removal request is Approved and finalized on March 5, 2024.    Thanks,  [facility] Team

## 2024-05-11 ENCOUNTER — PATIENT MESSAGE (OUTPATIENT)
Dept: ENDOCRINOLOGY CLINIC | Facility: CLINIC | Age: 39
End: 2024-05-11

## 2024-05-11 DIAGNOSIS — E11.9 TYPE 2 DIABETES MELLITUS WITHOUT COMPLICATION, WITHOUT LONG-TERM CURRENT USE OF INSULIN (HCC): Primary | ICD-10-CM

## 2024-05-13 RX ORDER — TIRZEPATIDE 7.5 MG/.5ML
7.5 INJECTION, SOLUTION SUBCUTANEOUS WEEKLY
Qty: 6 ML | Refills: 2 | Status: SHIPPED | OUTPATIENT
Start: 2024-05-13 | End: 2024-06-04

## 2024-05-13 NOTE — TELEPHONE ENCOUNTER
From: Mirna Allen  To: Desire Wong  Sent: 5/11/2024 10:37 AM CDT  Subject: Mounjaro    Would you be able to send a prescription for Mounjaro 7.5mg to [facility] for me with 2 refills? I’m hoping I can get a 3 month supply. The 10mg shots are still not available.   Thanks,  Jaclyn

## 2024-06-05 ENCOUNTER — APPOINTMENT (OUTPATIENT)
Dept: DERMATOLOGY | Age: 39
End: 2024-06-05

## 2024-06-05 DIAGNOSIS — B35.3 TINEA PEDIS, UNSPECIFIED LATERALITY: ICD-10-CM

## 2024-06-05 DIAGNOSIS — D23.9 BENIGN NEOPLASM OF SKIN, UNSPECIFIED LOCATION: Primary | ICD-10-CM

## 2024-06-05 DIAGNOSIS — D18.01 CHERRY ANGIOMA: ICD-10-CM

## 2024-06-05 DIAGNOSIS — Z12.83 SCREENING EXAM FOR SKIN CANCER: ICD-10-CM

## 2024-06-05 PROCEDURE — 99213 OFFICE O/P EST LOW 20 MIN: CPT | Performed by: DERMATOLOGY

## 2024-06-05 PROCEDURE — G2211 COMPLEX E/M VISIT ADD ON: HCPCS | Performed by: DERMATOLOGY

## 2024-06-19 LAB
ALBUMIN/GLOBULIN RATIO: 1.7 (CALC) (ref 1–2.5)
ALBUMIN: 4.2 G/DL (ref 3.6–5.1)
ALKALINE PHOSPHATASE: 44 U/L (ref 31–125)
ALT: 14 U/L (ref 6–29)
AST: 16 U/L (ref 10–30)
BILIRUBIN, TOTAL: 0.4 MG/DL (ref 0.2–1.2)
BUN: 9 MG/DL (ref 7–25)
CALCIUM: 9.2 MG/DL (ref 8.6–10.2)
CARBON DIOXIDE: 21 MMOL/L (ref 20–32)
CHLORIDE: 105 MMOL/L (ref 98–110)
CHOL/HDLC RATIO: 7.8 (CALC)
CHOLESTEROL, TOTAL: 233 MG/DL
CREATININE, RANDOM URINE: 501 MG/DL (ref 20–275)
CREATININE: 0.88 MG/DL (ref 0.5–0.99)
EGFR: 85 ML/MIN/1.73M2
GLOBULIN: 2.5 G/DL (CALC) (ref 1.9–3.7)
GLUCOSE: 91 MG/DL (ref 65–99)
HDL CHOLESTEROL: 30 MG/DL
HEMOGLOBIN A1C: 5.3 % OF TOTAL HGB
LDL-CHOLESTEROL: 172 MG/DL (CALC)
MICROALBUMIN/CREATININE RATIO, RANDOM URINE: 6 MG/G CREAT
MICROALBUMIN: 3.2 MG/DL
NON-HDL CHOLESTEROL: 203 MG/DL (CALC)
POTASSIUM: 4.3 MMOL/L (ref 3.5–5.3)
PROTEIN, TOTAL: 6.7 G/DL (ref 6.1–8.1)
SODIUM: 137 MMOL/L (ref 135–146)
TRIGLYCERIDES: 161 MG/DL

## 2024-07-09 ENCOUNTER — OFFICE VISIT (OUTPATIENT)
Dept: ENDOCRINOLOGY CLINIC | Facility: CLINIC | Age: 39
End: 2024-07-09
Payer: COMMERCIAL

## 2024-07-09 VITALS
DIASTOLIC BLOOD PRESSURE: 82 MMHG | BODY MASS INDEX: 28 KG/M2 | SYSTOLIC BLOOD PRESSURE: 118 MMHG | OXYGEN SATURATION: 99 % | WEIGHT: 191 LBS | HEART RATE: 88 BPM | RESPIRATION RATE: 20 BRPM

## 2024-07-09 DIAGNOSIS — E78.2 MIXED HYPERLIPIDEMIA: ICD-10-CM

## 2024-07-09 DIAGNOSIS — E11.9 TYPE 2 DIABETES MELLITUS WITHOUT COMPLICATION, WITHOUT LONG-TERM CURRENT USE OF INSULIN (HCC): Primary | ICD-10-CM

## 2024-07-09 PROCEDURE — 95251 CONT GLUC MNTR ANALYSIS I&R: CPT | Performed by: NURSE PRACTITIONER

## 2024-07-09 PROCEDURE — 99214 OFFICE O/P EST MOD 30 MIN: CPT | Performed by: NURSE PRACTITIONER

## 2024-07-09 RX ORDER — TIRZEPATIDE 7.5 MG/.5ML
7.5 INJECTION, SOLUTION SUBCUTANEOUS WEEKLY
Qty: 6 ML | Refills: 2 | Status: SHIPPED | OUTPATIENT
Start: 2024-07-09 | End: 2024-07-31

## 2024-07-09 NOTE — PROGRESS NOTES
HPI:   Mirna Allen is a 39 year old female who presents for management of her diabetes.        Chief Complaint   Patient presents with    Diabetes     F/U - streaming nilsa       Diabetes: stable, at goal  Type: 2   Duration: dx May 2021  Current Meds: Mounjaro 7.5mg injection weekly   SE: denies  Long term insulin use: n/a  Failed Meds: Metformin - diarrhea, Ozempic - severe GI effects  Complications: None    Personal Freestyle Nilsa 3 CGM  Analysis of data: 2024 - 2024  % Time CGM is Active: 100%  Sensor download: full report  in media  Average glucose : 105 mg/dl   GMI: 5.8%    CV (coefficient of variation) : 31.3%     2% time above 180mg /dl   1% time above 250 mg/dl  96% time in target range:  mg/dl   1% time below target range: 70mg/dl     Evaluation   1. Baseline glucose stable and in target range  2. Minimal postprandial elevations  3. Low likelihood of hypoglycemia  4. Minimal glucose variability         Overall glucose control:   HGBA1C:    Lab Results   Component Value Date    A1C 5.3 2024    A1C 5.3 2023    A1C 6.1 (H) 2023     2020       Hypertension: stable   Blood Pressure: 118/82   Medication prescribed: none  SE: n/a     Hyperlipidemia: needs improvement  LDL: 172   Tri  Medication prescribed: atorvastatin  SE: denies     Wt Readings from Last 3 Encounters:   24 191 lb (86.6 kg)   24 228 lb (103.4 kg)   23 255 lb (115.7 kg)     BP Readings from Last 3 Encounters:   24 118/82   24 130/62   23 116/80          Past History:   She  has a past medical history of Anxiety and depression (2013), BACK PAIN, Bronchitis, Diabetes (HCC), Hirsutism, History of blood transfusion, Hypercholesterolemia, Hyperlipidemia, LGA (large for gestational age) fetus (2015), Personal history of malignant melanoma of skin (2015), Post partum depression, Postpartum depression, postpartum condition (2013), and  Sinusitis.   Her family history includes Alcohol and Other Disorders Associated in her maternal grandfather and maternal grandmother; Breast Cancer (age of onset: 45) in her paternal grandmother; Cancer in her maternal grandfather, maternal grandmother, and paternal grandmother; Depression in her brother and mother; Diabetes in her mother; Heart Disease in her paternal grandfather; Obesity in her brother.   She  reports that she has never smoked. She has never used smokeless tobacco. She reports that she does not drink alcohol and does not use drugs.     She is allergic to azithromycin, sulfa antibiotics, sulfa drugs cross reactors, and zoloft [sertraline hcl].     Current Outpatient Medications on File Prior to Visit   Medication Sig    Continuous Blood Gluc Sensor (Enabled Employment TINO 3 SENSOR) Does not apply Misc 1 Device every 14 (fourteen) days.    atorvastatin 20 MG Oral Tab Take 1 tablet (20 mg total) by mouth nightly.    AUVI-Q 0.3 MG/0.3ML Injection Solution Auto-injector Inject 0.3 mL (1 each total) as directed as needed.    NALTREXONE HCL OR Take 4.5 mg by mouth daily.    [DISCONTINUED] Tirzepatide (MOUNJARO) 7.5 MG/0.5ML Subcutaneous Solution Pen-injector Inject 7.5 mg into the skin once a week for 4 doses.    ALPRAZolam 0.25 MG Oral Tab Take 1 tablet (0.25 mg total) by mouth 2 (two) times daily as needed for Anxiety.    escitalopram 5 MG Oral Tab Take 1 tablet (5 mg total) by mouth daily.    escitalopram 5 MG Oral Tab Take 0.5 tablets (2.5 mg total) by mouth daily.    [DISCONTINUED] Tirzepatide (MOUNJARO) 10 MG/0.5ML Subcutaneous Solution Pen-injector Inject 10 mg into the skin once a week.     No current facility-administered medications on file prior to visit.       CMP  (most recent labs)   Lab Results   Component Value Date/Time    GLU 91 06/18/2024 10:22 AM    BUN 9 06/18/2024 10:22 AM    CREATSERUM 0.88 06/18/2024 10:22 AM    GFRNAA 90 04/29/2021 01:35 PM    GFRAA 103 04/29/2021 01:35 PM    EGFRCR 85  06/18/2024 10:22 AM    CA 9.2 06/18/2024 10:22 AM    ALKPHO 44 06/18/2024 10:22 AM    AST 16 06/18/2024 10:22 AM    ALT 14 06/18/2024 10:22 AM    BILT 0.4 06/18/2024 10:22 AM    TP 6.7 06/18/2024 10:22 AM    ALB 4.2 06/18/2024 10:22 AM     06/18/2024 10:22 AM    K 4.3 06/18/2024 10:22 AM     06/18/2024 10:22 AM    CO2 21 06/18/2024 10:22 AM           Lipids  (most recent labs)   Lab Results   Component Value Date/Time    CHOLEST 233 (H) 06/18/2024 10:22 AM    TRIG 161 (H) 06/18/2024 10:22 AM    HDL 30 (L) 06/18/2024 10:22 AM     (H) 06/18/2024 10:22 AM    NONHDLC 203 (H) 06/18/2024 10:22 AM          Diabetes  (most recent labs)   Lab Results   Component Value Date/Time    A1C 5.3 06/18/2024 10:22 AM          Microalb (most recent labs)   Lab Results   Component Value Date/Time    MICROALBUMIN 3.2 06/18/2024 10:22 AM    MICROALBCREA 5.2 08/10/2021 04:41 PM        Lab results reviewed with patient.    REVIEW OF SYSTEMS:   GENERAL HEALTH: feels well otherwise  SKIN: denies any unusual skin lesions or rashes  RESPIRATORY: denies shortness of breath with exertion  CARDIOVASCULAR: denies chest pain on exertion  GI: denies nausea, abdominal pain or heartburn  NEURO: denies headaches and denies numbness and tingling to extremities  ENDO: denies polydipsia, polyuria or polyphagia, denies unexplained weight changes    EXAM:   /82   Pulse 88   Resp 20   Wt 191 lb (86.6 kg)   SpO2 99%   BMI 28.21 kg/m²  Estimated body mass index is 28.21 kg/m² as calculated from the following:    Height as of 3/27/23: 5' 9\" (1.753 m).    Weight as of this encounter: 191 lb (86.6 kg).   Physical Exam  Vitals reviewed.   Constitutional:       Appearance: Normal appearance.   Pulmonary:      Effort: Pulmonary effort is normal.   Neurological:      Mental Status: She is alert and oriented to person, place, and time.   Psychiatric:         Mood and Affect: Mood normal.         Behavior: Behavior normal.          ASSESSMENT AND PLAN:   As for her Diabetes, it is well controlled, stable, no significant medication side effects noted.     Recommendations are: continue present meds, lose weight by increased dietary compliance and exercise, and check feet daily.    Patient to continue Mounjaro 7.5mg injection weekly    Patient to continue to use personal Freestyle Nilsa 3 CGM for glucose monitoring.  Discussed self monitoring blood glucose and the importance of monitoring.  Patient agreed to monitoring bid - fasting and 2 hours postprandial of one meal per day if not using CGM.      Reinforced healthy eating in healthy portions and increasing daily physical activity.       As for her hypertension, Blood Pressure is well controlled, stable.   PLAN: reviewed diet, exercise and weight control    Per ADA Standards of Care: An ACE inhibitor or an angiotensin receptor blocker is not recommended for the primary prevention of chronic kidney disease in patients with diabetes who have normal blood pressure, normal urinary albumin-to-creatinine ratio (<30 mg/g creatinine), and normal estimated glomerular filtration rate.     As for her cholesterol, Lipids are no significant medication side effects noted, needs further observation, needs improvement, needs to follow diet more regularly.   PLAN: will continue present medications, reviewed diet, exercise and weight control, continue statin therapy, will recheck labs in 3 months.      DM Health Maintenance  Last dilated eye exam: 2024  Last diabetic foot exam: Last Foot Exam: 24    Date of last PHQ-2 depression screen: PHQ-2 - Date of last depression screenin2024    Patient  reports that she has never smoked. She has never used smokeless tobacco.  When is flu vaccine due? No recommendations at this time  When is pneumonia vaccine due? Pneumococcal Vaccination(1 of 2 - PCV) Never done    The patient indicates understanding of these issues and agrees to the  plan.  Refills addressed at time of office visit.    Diagnoses and all orders for this visit:    Type 2 diabetes mellitus without complication, without long-term current use of insulin (HCC)  -     Tirzepatide (MOUNJARO) 7.5 MG/0.5ML Subcutaneous Solution Pen-injector; Inject 7.5 mg into the skin once a week for 4 doses.  -     HGB A1C [496] [Q]    Mixed hyperlipidemia  -     LIPID PANEL [7600] [Q]         Return in about 3 months (around 10/9/2024) for 45 minute appointment, Personal CGM.    The risks and benefits of my recommendations, as well as other treatment options were discussed with the patient today. Questions were answered to the best of my knowledge.   25 min spent with patient and >50% time spent counseling and coordinating care related to their office visit.      Desier DIOR, BC-ADM, [facility]

## 2024-10-27 ENCOUNTER — PATIENT MESSAGE (OUTPATIENT)
Dept: ENDOCRINOLOGY CLINIC | Facility: CLINIC | Age: 39
End: 2024-10-27

## 2024-10-27 LAB
CHOL/HDLC RATIO: 5 (CALC)
CHOLESTEROL, TOTAL: 216 MG/DL
HDL CHOLESTEROL: 43 MG/DL
HEMOGLOBIN A1C: 5.3 % OF TOTAL HGB
LDL-CHOLESTEROL: 154 MG/DL (CALC)
NON-HDL CHOLESTEROL: 173 MG/DL (CALC)
TRIGLYCERIDES: 86 MG/DL

## 2024-10-28 NOTE — TELEPHONE ENCOUNTER
Patient is requesting a virtual visit for 10- , not streaming Nilsa.    HGBA1C:    Lab Results   Component Value Date    A1C 5.3 10/26/2024    A1C 5.3 06/18/2024    A1C 5.3 12/30/2023     06/16/2020

## 2024-11-01 ENCOUNTER — TELEMEDICINE (OUTPATIENT)
Dept: ENDOCRINOLOGY CLINIC | Facility: CLINIC | Age: 39
End: 2024-11-01
Payer: COMMERCIAL

## 2024-11-01 DIAGNOSIS — E11.9 TYPE 2 DIABETES MELLITUS WITHOUT COMPLICATION, WITHOUT LONG-TERM CURRENT USE OF INSULIN (HCC): Primary | ICD-10-CM

## 2024-11-01 DIAGNOSIS — E78.2 MIXED HYPERLIPIDEMIA: ICD-10-CM

## 2024-11-01 PROCEDURE — 99214 OFFICE O/P EST MOD 30 MIN: CPT | Performed by: NURSE PRACTITIONER

## 2024-11-01 RX ORDER — TIRZEPATIDE 7.5 MG/.5ML
7.5 INJECTION, SOLUTION SUBCUTANEOUS WEEKLY
Qty: 6 ML | Refills: 1 | Status: SHIPPED | OUTPATIENT
Start: 2024-11-01

## 2024-11-01 NOTE — PROGRESS NOTES
HPI:   Mirna Allen is a 39 year old female who presents virtually for the management of her diabetes.   This visit is conducted using Telemedicine with live, two way, interactive video and audio.  Patient verbally consents to Telemedicine visit.  Patient understands and accepts financial responsibility for any deductible, co-insurance and/or co-pays associated with this service.    Chief Complaint: Diabetes Follow Up    Diabetes: stable, at goal  Type: 2   Duration: dx May 2021  Current Meds: Mounjaro 7.5mg injection weekly   SE: denies  Long term insulin use: n/a  Failed Meds: Metformin - diarrhea, Ozempic - severe GI effects    Complications: None    *Not currently using CGM or monitoring fingerstick glucose     Overall glucose control:   HGBA1C:    Lab Results   Component Value Date    A1C 5.3 10/26/2024    A1C 5.3 06/18/2024    A1C 5.3 12/30/2023     06/16/2020          Wt Readings from Last 3 Encounters:   07/09/24 191 lb (86.6 kg)   01/04/24 228 lb (103.4 kg)   03/27/23 255 lb (115.7 kg)           Past History:   She  has a past medical history of Anxiety and depression (06/28/2013), BACK PAIN, Bronchitis, Diabetes (HCC), Hirsutism, History of blood transfusion, Hypercholesterolemia, Hyperlipidemia, LGA (large for gestational age) fetus (03/17/2015), Personal history of malignant melanoma of skin (04/27/2015), Post partum depression, Postpartum depression, postpartum condition (06/28/2013), and Sinusitis.   Her family history includes Alcohol and Other Disorders Associated in her maternal grandfather and maternal grandmother; Breast Cancer (age of onset: 45) in her paternal grandmother; Cancer in her maternal grandfather, maternal grandmother, and paternal grandmother; Depression in her brother and mother; Diabetes in her mother; Heart Disease in her paternal grandfather; Obesity in her brother.   She  reports that she has never smoked. She has never used smokeless tobacco. She reports that she  does not drink alcohol and does not use drugs.     She is allergic to azithromycin, sulfa antibiotics, sulfa drugs cross reactors, and zoloft [sertraline hcl].     Current Outpatient Medications on File Prior to Visit   Medication Sig    [DISCONTINUED] Tirzepatide (MOUNJARO) 7.5 MG/0.5ML Subcutaneous Solution Pen-injector Inject 7.5 mg into the skin once a week for 4 doses.    ALPRAZolam 0.25 MG Oral Tab Take 1 tablet (0.25 mg total) by mouth 2 (two) times daily as needed for Anxiety.    escitalopram 5 MG Oral Tab Take 1 tablet (5 mg total) by mouth daily.    escitalopram 5 MG Oral Tab Take 0.5 tablets (2.5 mg total) by mouth daily.    Continuous Blood Gluc Sensor (Serious USASTYLE TINO 3 SENSOR) Does not apply Misc 1 Device every 14 (fourteen) days.    atorvastatin 20 MG Oral Tab Take 1 tablet (20 mg total) by mouth nightly.    AUVI-Q 0.3 MG/0.3ML Injection Solution Auto-injector Inject 0.3 mL (1 each total) as directed as needed.    NALTREXONE HCL OR Take 4.5 mg by mouth daily.     No current facility-administered medications on file prior to visit.       CMP  (most recent labs)   Lab Results   Component Value Date/Time    GLU 91 06/18/2024 10:22 AM    BUN 9 06/18/2024 10:22 AM    CREATSERUM 0.88 06/18/2024 10:22 AM    GFRNAA 90 04/29/2021 01:35 PM    GFRAA 103 04/29/2021 01:35 PM    EGFRCR 85 06/18/2024 10:22 AM    CA 9.2 06/18/2024 10:22 AM    ALKPHO 44 06/18/2024 10:22 AM    AST 16 06/18/2024 10:22 AM    ALT 14 06/18/2024 10:22 AM    BILT 0.4 06/18/2024 10:22 AM    TP 6.7 06/18/2024 10:22 AM    ALB 4.2 06/18/2024 10:22 AM     06/18/2024 10:22 AM    K 4.3 06/18/2024 10:22 AM     06/18/2024 10:22 AM    CO2 21 06/18/2024 10:22 AM           Lipids  (most recent labs)   Lab Results   Component Value Date/Time    CHOLEST 216 (H) 10/26/2024 09:58 AM    TRIG 86 10/26/2024 09:58 AM    HDL 43 (L) 10/26/2024 09:58 AM     (H) 10/26/2024 09:58 AM    NONHDLC 173 (H) 10/26/2024 09:58 AM          Diabetes  (most  recent labs)   Lab Results   Component Value Date/Time    A1C 5.3 10/26/2024 09:58 AM          Microalb (most recent labs)   Lab Results   Component Value Date/Time    MICROALBUMIN 3.2 2024 10:22 AM    MICROALBCREA 5.2 08/10/2021 04:41 PM        Lab results reviewed with patient.    REVIEW OF SYSTEMS:   GENERAL HEALTH: feels well otherwise  SKIN: denies any unusual skin lesions or rashes  RESPIRATORY: denies shortness of breath with exertion  CARDIOVASCULAR: denies chest pain on exertion  GI: denies abdominal pain and denies heartburn  NEURO: denies headaches     EXAM:   Physical Exam  Constitutional:       Appearance: Normal appearance.   Pulmonary:      Comments: Able to speak in complete sentences without difficulty  Neurological:      Mental Status: She is alert and oriented to person, place, and time.   Psychiatric:         Mood and Affect: Mood normal.         Behavior: Behavior normal.         ASSESSMENT AND PLAN:   Diabetes:  Patient to continue Mounjaro 7.5mg injection once weekly.  Discussed self monitoring blood glucose and the importance of monitoring.  Patient agreed to monitoring 2-3x/week - alternating fasting and 2 hours postprandial of one meal per day if not using CGM.  Reinforced healthy eating in healthy portions and increasing daily physical activity.  Patient to have labs done prior to next office visit.    Hyperlipidemia:  Patient to continue statin therapy.  Recommended increasing atorvastatin dose to 40 mg nightly, patient declines at this time.  Will start taking Pierceton 3 OTC.        DM Health Maintenance  Last dilated eye exam: Last Dilated Eye Exam: 24   Exam shows retinopathy? Eye Exam shows Diabetic Retinopathy?: No    Last diabetic foot exam: Last Foot Exam: 24    Date of last PHQ-2 depression screen: PHQ-2 - Date of last depression screenin2024    Patient  reports that she has never smoked. She has never used smokeless tobacco.  When is flu vaccine due?  Influenza Vaccine(1) due on 10/01/2024  When is pneumonia vaccine due? Pneumococcal Vaccination(1 of 2 - PCV) Never done    The patient indicates understanding of these issues and agrees to the plan.  Refills addressed at time of office visit.    Diagnoses and all orders for this visit:    Type 2 diabetes mellitus without complication, without long-term current use of insulin (HCC)  -     Tirzepatide (MOUNJARO) 7.5 MG/0.5ML Subcutaneous Solution Auto-injector; Inject 7.5 mg into the skin once a week.  -     COMP METABOLIC PANEL [65703] [Q]  -     HGB A1C [496] [Q]  -     MICROALB/CREAT RATIO, RANDOM URINE [8217] [Q]    Mixed hyperlipidemia  -     LIPID PANEL [7230] [Q]       Return in about 6 months (around 5/1/2025) for 45 minute appointment.    The risks and benefits of my recommendations, as well as other treatment options were discussed with the patient today. questions were answered to the best of my knowledge.  Patient verbalizes understanding and amendable to plan of care.  Duration of this service:  15 minutes   Desire DIOR, BC-ADM, [facility]

## 2024-11-06 DIAGNOSIS — E11.9 TYPE 2 DIABETES MELLITUS WITHOUT COMPLICATION, WITHOUT LONG-TERM CURRENT USE OF INSULIN (HCC): ICD-10-CM

## 2024-11-06 NOTE — TELEPHONE ENCOUNTER
Received refill request for Mounjaro through [facility]     Requested Prescriptions     Pending Prescriptions Disp Refills    Tirzepatide (MOUNJARO) 7.5 MG/0.5ML Subcutaneous Solution Auto-injector 6 mL 1     Sig: Inject 7.5 mg into the skin once a week.     Future Appointments   Date Time Provider Department Center   6/5/2025  9:00 AM Desire Wong APRN [facility]     Last A1c value was 5.3% done 10/26/2024.  Refill 11/1/24 [facility] was last too    LOV 11/1/24 D.Marvin

## 2024-11-07 RX ORDER — TIRZEPATIDE 7.5 MG/.5ML
7.5 INJECTION, SOLUTION SUBCUTANEOUS WEEKLY
Qty: 6 ML | Refills: 1 | Status: SHIPPED | OUTPATIENT
Start: 2024-11-07

## 2025-01-04 DIAGNOSIS — E11.9 TYPE 2 DIABETES MELLITUS WITHOUT COMPLICATION, WITHOUT LONG-TERM CURRENT USE OF INSULIN (HCC): ICD-10-CM

## 2025-01-06 RX ORDER — ACYCLOVIR 800 MG/1
1 TABLET ORAL
Qty: 6 EACH | Refills: 3 | Status: SHIPPED | OUTPATIENT
Start: 2025-01-06

## 2025-01-06 NOTE — TELEPHONE ENCOUNTER
Requested Prescriptions     Pending Prescriptions Disp Refills    FREESTYLE TINO 3 SENSOR Does not apply Misc [Pharmacy Med Name: FREESTYLE LIBRE_3 SENSOR] 6 each 3     Sig: CHANGE 1 SENSOR EVERY 14 DAYS     Future Appointments   Date Time Provider Department Center   6/5/2025  9:00 AM Desire Wong APRN [facility]     Your appointments       Date & Time Appointment Department ([place])    Jun 05, 2025 9:00 AM CDT Diabetes Pump follow up with Desire Wong APRN [facility] ([facility])              [facility]  [facility]  [address] [address] [phone]          Last A1c value was 5.3% done 10/26/2024.  Last OV:11/01/2024-  Last refill:01/04/2024[facility]

## 2025-03-16 DIAGNOSIS — E11.9 TYPE 2 DIABETES MELLITUS WITHOUT COMPLICATION, WITHOUT LONG-TERM CURRENT USE OF INSULIN (HCC): ICD-10-CM

## 2025-03-17 RX ORDER — TIRZEPATIDE 7.5 MG/.5ML
1.5 INJECTION, SOLUTION SUBCUTANEOUS WEEKLY
Qty: 6 ML | Refills: 3 | Status: SHIPPED | OUTPATIENT
Start: 2025-03-17

## 2025-03-17 NOTE — TELEPHONE ENCOUNTER
Requested Prescriptions     Pending Prescriptions Disp Refills    MOUNJARO 7.5 MG/0.5ML Subcutaneous Solution Auto-injector [Pharmacy Med Name: MOUNJARO PEN 7.5MG/0.5ML] 6 mL 3     Sig: INJECT THE CONTENTS OF ONE PEN  SUBCUTANEOUSLY WEEKLY AS  DIRECTED     Future Appointments   Date Time Provider Department Center   6/5/2025  9:00 AM Desire Wong APRN [facility]     Last A1c value was 5.3% done 10/26/2024.  Refill 11/07/24 [facility]   LOV 11/01/24 [facility]

## 2025-05-04 ENCOUNTER — PATIENT MESSAGE (OUTPATIENT)
Dept: ENDOCRINOLOGY CLINIC | Facility: CLINIC | Age: 40
End: 2025-05-04

## 2025-05-04 DIAGNOSIS — E11.9 TYPE 2 DIABETES MELLITUS WITHOUT COMPLICATION, WITHOUT LONG-TERM CURRENT USE OF INSULIN (HCC): Primary | ICD-10-CM

## 2025-05-05 ENCOUNTER — TELEPHONE (OUTPATIENT)
Facility: CLINIC | Age: 40
End: 2025-05-05

## 2025-05-05 RX ORDER — TIRZEPATIDE 10 MG/.5ML
10 INJECTION, SOLUTION SUBCUTANEOUS
Qty: 6 ML | Refills: 1 | Status: SHIPPED | OUTPATIENT
Start: 2025-05-05

## 2025-05-05 NOTE — TELEPHONE ENCOUNTER
Last office visit 11/2024  Future Appointments   Date Time Provider Department Center   6/5/2025  9:00 AM Desire Wong APRN [facility]     Last A1c value was 5.3% done 10/26/2024.

## 2025-05-05 NOTE — TELEPHONE ENCOUNTER
Exam report for DR no evidence of it  -abstracted into epic     Exam date 04/24/25  Emailed scanned to Aura DAVIS to hand to provider for review with bar code to send to scan after

## 2025-06-01 LAB
ALBUMIN/GLOBULIN RATIO: 1.9 (CALC) (ref 1–2.5)
ALBUMIN: 4.2 G/DL (ref 3.6–5.1)
ALKALINE PHOSPHATASE: 47 U/L (ref 31–125)
ALT: 9 U/L (ref 6–29)
AST: 12 U/L (ref 10–30)
BILIRUBIN, TOTAL: 0.4 MG/DL (ref 0.2–1.2)
BUN: 15 MG/DL (ref 7–25)
CALCIUM: 9.2 MG/DL (ref 8.6–10.2)
CARBON DIOXIDE: 20 MMOL/L (ref 20–32)
CHLORIDE: 108 MMOL/L (ref 98–110)
CHOL/HDLC RATIO: 5.1 (CALC)
CHOLESTEROL, TOTAL: 241 MG/DL
CREATININE, RANDOM URINE: 237 MG/DL (ref 20–275)
CREATININE: 0.74 MG/DL (ref 0.5–0.99)
EGFR: 104 ML/MIN/1.73M2
GLOBULIN: 2.2 G/DL (CALC) (ref 1.9–3.7)
GLUCOSE: 93 MG/DL (ref 65–99)
HDL CHOLESTEROL: 47 MG/DL
HEMOGLOBIN A1C: 5.1 %
LDL-CHOLESTEROL: 173 MG/DL (CALC)
MICROALBUMIN/CREATININE RATIO, RANDOM URINE: 4 MG/G CREAT
MICROALBUMIN: 0.9 MG/DL
NON-HDL CHOLESTEROL: 194 MG/DL (CALC)
POTASSIUM: 4.2 MMOL/L (ref 3.5–5.3)
PROTEIN, TOTAL: 6.4 G/DL (ref 6.1–8.1)
SODIUM: 138 MMOL/L (ref 135–146)
TRIGLYCERIDES: 95 MG/DL

## 2025-06-05 ENCOUNTER — OFFICE VISIT (OUTPATIENT)
Dept: ENDOCRINOLOGY CLINIC | Facility: CLINIC | Age: 40
End: 2025-06-05
Payer: COMMERCIAL

## 2025-06-05 VITALS
WEIGHT: 188 LBS | DIASTOLIC BLOOD PRESSURE: 64 MMHG | SYSTOLIC BLOOD PRESSURE: 120 MMHG | BODY MASS INDEX: 28 KG/M2 | RESPIRATION RATE: 16 BRPM | HEART RATE: 79 BPM

## 2025-06-05 DIAGNOSIS — E78.2 MIXED HYPERLIPIDEMIA: ICD-10-CM

## 2025-06-05 DIAGNOSIS — E11.9 TYPE 2 DIABETES MELLITUS WITHOUT COMPLICATION, WITHOUT LONG-TERM CURRENT USE OF INSULIN (HCC): Primary | ICD-10-CM

## 2025-06-05 PROBLEM — E66.01 CLASS 2 SEVERE OBESITY DUE TO EXCESS CALORIES WITH SERIOUS COMORBIDITY AND BODY MASS INDEX (BMI) OF 36.0 TO 36.9 IN ADULT (HCC): Status: RESOLVED | Noted: 2021-08-10 | Resolved: 2025-06-05

## 2025-06-05 PROBLEM — E66.812 CLASS 2 SEVERE OBESITY DUE TO EXCESS CALORIES WITH SERIOUS COMORBIDITY AND BODY MASS INDEX (BMI) OF 36.0 TO 36.9 IN ADULT (HCC): Status: RESOLVED | Noted: 2021-08-10 | Resolved: 2025-06-05

## 2025-06-05 LAB
GLUCOSE BLOOD: 100
TEST STRIP LOT #: NORMAL NUMERIC

## 2025-06-05 PROCEDURE — 99214 OFFICE O/P EST MOD 30 MIN: CPT | Performed by: NURSE PRACTITIONER

## 2025-06-05 PROCEDURE — 82947 ASSAY GLUCOSE BLOOD QUANT: CPT | Performed by: NURSE PRACTITIONER

## 2025-06-05 RX ORDER — ATORVASTATIN CALCIUM 20 MG/1
20 TABLET, FILM COATED ORAL NIGHTLY
Qty: 90 TABLET | Refills: 3 | Status: SHIPPED | OUTPATIENT
Start: 2025-06-05

## 2025-06-05 NOTE — PROGRESS NOTES
HPI:   Mirna Allen is a 40 year old female who presents follow up for the management of her diabetes.        Chief Complaint   Patient presents with    Diabetes     Follow up-Nilsa       Diabetes: stable, at goal  Type: 2   Duration: dx May 2021  Current Meds: Mounjaro 7.5mg injection weekly - hasn't started Mounjaro 10mg injection weekly yet  SE: denies  Long term insulin use: n/a  Failed Meds: Metformin - diarrhea, Ozempic - severe GI effects  Complications: None    *Patient is not currently using personal CGM or fingerstick glucose monitoring to monitor blood glucose         Overall glucose control:   HGBA1C:    Lab Results   Component Value Date    A1C 5.1 2025    A1C 5.3 10/26/2024    A1C 5.3 2024     2020       Hypertension: stable, at goal   Blood Pressure: 120/64   Medication prescribed: none  SE: n/a     Hyperlipidemia: needs improvement  LDL: 173   Tri  Medication prescribed: atorvastatin  - patient not taking   SE: denies     Wt Readings from Last 3 Encounters:   25 188 lb (85.3 kg)   24 191 lb (86.6 kg)   24 228 lb (103.4 kg)     BP Readings from Last 3 Encounters:   25 120/64   24 118/82   24 130/62          Past History:   She  has a past medical history of Anxiety and depression (2013), BACK PAIN, Bronchitis, Diabetes (HCC), Hirsutism, History of blood transfusion, Hypercholesterolemia, Hyperlipidemia, LGA (large for gestational age) fetus (2015), Personal history of malignant melanoma of skin (2015), Post partum depression, Postpartum depression, postpartum condition (2013), and Sinusitis.   Her family history includes Alcohol and Other Disorders Associated in her maternal grandfather and maternal grandmother; Breast Cancer (age of onset: 45) in her paternal grandmother; Cancer in her maternal grandfather, maternal grandmother, and paternal grandmother; Depression in her brother and mother; Diabetes in her  mother; Heart Disease in her paternal grandfather; Obesity in her brother.   She  reports that she has never smoked. She has never used smokeless tobacco. She reports that she does not drink alcohol and does not use drugs.     She is allergic to azithromycin, sulfa antibiotics, sulfa drugs cross reactors, and zoloft [sertraline hcl].     Current Outpatient Medications on File Prior to Visit   Medication Sig    Tirzepatide (MOUNJARO) 10 MG/0.5ML Subcutaneous Solution Auto-injector Inject 10 mg into the skin every 7 days.    ALPRAZolam 0.25 MG Oral Tab Take 1 tablet (0.25 mg total) by mouth 2 (two) times daily as needed for Anxiety.    escitalopram 5 MG Oral Tab Take 1 tablet (5 mg total) by mouth daily.    escitalopram 5 MG Oral Tab Take 0.5 tablets (2.5 mg total) by mouth daily.    AUVI-Q 0.3 MG/0.3ML Injection Solution Auto-injector Inject 0.3 mL (1 each total) as directed as needed.    NALTREXONE HCL OR Take 4.5 mg by mouth daily.    FREESTYLE TINO 3 SENSOR Does not apply Misc CHANGE 1 SENSOR EVERY 14 DAYS (Patient not taking: Reported on 6/5/2025)    [DISCONTINUED] atorvastatin 20 MG Oral Tab Take 1 tablet (20 mg total) by mouth nightly.     No current facility-administered medications on file prior to visit.       CMP  (most recent labs)   Lab Results   Component Value Date/Time    GLU 93 05/31/2025 09:38 AM    BUN 15 05/31/2025 09:38 AM    CREATSERUM 0.74 05/31/2025 09:38 AM    GFRNAA 90 04/29/2021 01:35 PM    GFRAA 103 04/29/2021 01:35 PM    EGFRCR 104 05/31/2025 09:38 AM    CA 9.2 05/31/2025 09:38 AM    ALKPHO 47 05/31/2025 09:38 AM    AST 12 05/31/2025 09:38 AM    ALT 9 05/31/2025 09:38 AM    BILT 0.4 05/31/2025 09:38 AM    TP 6.4 05/31/2025 09:38 AM    ALB 4.2 05/31/2025 09:38 AM     05/31/2025 09:38 AM    K 4.2 05/31/2025 09:38 AM     05/31/2025 09:38 AM    CO2 20 05/31/2025 09:38 AM           Lipids  (most recent labs)   Lab Results   Component Value Date/Time    CHOLEST 241 (H) 05/31/2025  09:38 AM    TRIG 95 05/31/2025 09:38 AM    HDL 47 (L) 05/31/2025 09:38 AM     (H) 05/31/2025 09:38 AM    NONHDLC 194 (H) 05/31/2025 09:38 AM          Diabetes  (most recent labs)   Lab Results   Component Value Date/Time    A1C 5.1 05/31/2025 09:38 AM          Microalb (most recent labs)   Lab Results   Component Value Date/Time    MICROALBUMIN 0.9 05/31/2025 09:38 AM    MICROALBCREA 5.2 08/10/2021 04:41 PM        Lab results reviewed with patient.    REVIEW OF SYSTEMS:   GENERAL HEALTH: feels well otherwise  SKIN: denies any unusual skin lesions or rashes  RESPIRATORY: denies shortness of breath with exertion  CARDIOVASCULAR: denies chest pain on exertion  GI: denies nausea, abdominal pain or heartburn  NEURO: c/o occasional headaches, denies numbness and tingling to extremities  ENDO: denies polydipsia, polyuria or polyphagia, denies unexplained weight changes    EXAM:   /64   Pulse 79   Resp 16   Wt 188 lb (85.3 kg)   BMI 27.76 kg/m²  Estimated body mass index is 27.76 kg/m² as calculated from the following:    Height as of 3/27/23: 5' 9\" (1.753 m).    Weight as of this encounter: 188 lb (85.3 kg).   Physical Exam  Vitals reviewed.   Constitutional:       Appearance: Normal appearance.   Cardiovascular:      Pulses:           Dorsalis pedis pulses are 2+ on the right side and 2+ on the left side.   Pulmonary:      Effort: Pulmonary effort is normal.   Feet:      Right foot:      Protective Sensation: 5 sites tested.  5 sites sensed.      Skin integrity: Skin integrity normal.      Toenail Condition: Right toenails are normal.      Left foot:      Protective Sensation: 5 sites tested.  5 sites sensed.      Skin integrity: Skin integrity normal.      Toenail Condition: Left toenails are normal.      Comments: Diabetes foot exam performed: Vibration to dorsum to the first toe perceived bilaterally.  Foot care practices reviewed with patient.    Neurological:      Mental Status: She is alert and  oriented to person, place, and time.   Psychiatric:         Mood and Affect: Mood normal.         Behavior: Behavior normal.         ASSESSMENT AND PLAN:   As for her Diabetes, it is well controlled, stable, no significant medication side effects noted.     Recommendations are: continue present meds, lose weight by increased dietary compliance and exercise, and check feet daily.    Patient to increase Mounjaro to 10mg injection weekly    Discussed self monitoring blood glucose and the importance of monitoring.  Patient agreed to monitoring daily - alternating fasting in AM and 2 hours postprandial of one meal per day.    Reinforced healthy eating in healthy portions and increasing daily physical activity.         As for her hypertension, Blood Pressure is well controlled, stable.   PLAN: reviewed diet, exercise and weight control    Per ADA Standards of Care: An ACE inhibitor or an angiotensin receptor blocker is not recommended for the primary prevention of chronic kidney disease in patients with diabetes who have normal blood pressure, normal urinary albumin-to-creatinine ratio (<30 mg/g creatinine), and normal estimated glomerular filtration rate.     As for her cholesterol, Lipids are no significant medication side effects noted, needs further observation, needs improvement, needs to follow diet more regularly.   PLAN: reviewed diet, exercise and weight control  Discussed ACC/AHA/ADA guidelines for statin therapy.  Patient verbalizes understanding and is amendable to starting atorvastatin 20mg po nightly.  Will repeat lipids in 6 months.        DM Health Maintenance  Last dilated eye exam: 2025 - no retinopathy  Last diabetic foot exam: Last Foot Exam: 25      Date of last PHQ-2 depression screen: PHQ-2 - Date of last depression screenin2025      Patient  reports that she has never smoked. She has never used smokeless tobacco.  When is flu vaccine due? No recommendations at this time  When is  pneumonia vaccine due? Pneumococcal Vaccination(1 of 2 - PCV) Never done    The patient indicates understanding of these issues and agrees to the plan.  Refills addressed at time of office visit.    Diagnoses and all orders for this visit:    Type 2 diabetes mellitus without complication, without long-term current use of insulin (HCC)  -     HemoCue Glucose 201 (Glucose blood test)  -     HGB A1C [496] [Q]    Mixed hyperlipidemia  -     atorvastatin 20 MG Oral Tab; Take 1 tablet (20 mg total) by mouth nightly.  -     LIPID PANEL [7600] [Q]         Return in about 6 months (around 12/5/2025) for 45 minute appointment, Virtual Visit.    The risks and benefits of my recommendations, as well as other treatment options were discussed with the patient today. Questions were answered to the best of my knowledge.   25 min spent with patient and >50% time spent counseling and coordinating care related to their office visit.      Desire DIOR, BC-ADM, [facility]

## 2025-06-11 ENCOUNTER — PATIENT MESSAGE (OUTPATIENT)
Dept: ENDOCRINOLOGY CLINIC | Facility: CLINIC | Age: 40
End: 2025-06-11

## 2025-06-11 DIAGNOSIS — E11.9 TYPE 2 DIABETES MELLITUS WITHOUT COMPLICATION, WITHOUT LONG-TERM CURRENT USE OF INSULIN (HCC): ICD-10-CM

## 2025-06-11 RX ORDER — TIRZEPATIDE 10 MG/.5ML
10 INJECTION, SOLUTION SUBCUTANEOUS
Qty: 6 ML | Refills: 0 | Status: SHIPPED | OUTPATIENT
Start: 2025-06-11

## 2025-06-11 NOTE — TELEPHONE ENCOUNTER
Patient sent My Chart Message - [facility] does not have supply of Mounjaro 10 mg- requested to local [facility]'s pharmacy - prescription pended.     Last office visit and Provider: 6/5/25 - BARBY Marvin   Future Appointments   Date Time Provider Department Center   12/5/2025  8:15 AM Desire Wong APRN [id number]   Last A1c value was 5.1% done 5/31/2025.

## 2025-08-08 ENCOUNTER — PATIENT MESSAGE (OUTPATIENT)
Dept: ENDOCRINOLOGY CLINIC | Facility: CLINIC | Age: 40
End: 2025-08-08

## 2025-08-08 DIAGNOSIS — E11.9 TYPE 2 DIABETES MELLITUS WITHOUT COMPLICATION, WITHOUT LONG-TERM CURRENT USE OF INSULIN (HCC): Primary | ICD-10-CM

## 2025-08-08 RX ORDER — TIRZEPATIDE 7.5 MG/.5ML
7.5 INJECTION, SOLUTION SUBCUTANEOUS WEEKLY
Qty: 6 ML | Refills: 0 | Status: SHIPPED | OUTPATIENT
Start: 2025-08-08

## (undated) DIAGNOSIS — E78.5 DYSLIPIDEMIA: ICD-10-CM

## (undated) DIAGNOSIS — E78.00 HYPERCHOLESTEROLEMIA: Primary | ICD-10-CM

## (undated) NOTE — LETTER
05/03/21        Joni Strong Nickel  [address] [place] [facility] [address]      Dear Giovani Ledbetter,    [facility] records indicate that you have outstanding lab work and or testing that was ordered for you and has not yet been completed:  Orders Placed This Encounter

## (undated) NOTE — LETTER
Date & Time: 4/29/2021, 3:23 PM  Patient: Riya Young  Encounter Provider(s):    Christal Plata MD       To Whom It May Concern:    Ela Hess was seen and treated in our department on 4/29/2021. She can return to work.     If you have any que

## (undated) NOTE — LETTER
[facility] IN [place]  [address] [address]  Dept: [phone]  Dept Fax: [phone]         May 15, 2018    Patient: Jackie Allen   YOB: 1985   Date of Visit: 5/15/2018       To Whom It May Concern:    Madelyn

## (undated) NOTE — LETTER
Date & Time: 3/8/2023, 4:49 PM  Patient: Ivet Allen  Encounter Provider(s):    BARBY Dwyer       To Whom It May Concern:    Ramirez Ferreira was seen and treated in our department on 3/8/2023. She should not return to work until she is fever free for 24 hours without the use of fever reducing medications.  .    If you have any questions or concerns, please do not hesitate to call.        _____________________________  Physician/APC Signature

## (undated) NOTE — LETTER
[facility], [place], [address]  [place] [address]  [address]: [phone]  FAX: [address] [place] [address]       My office staff  have made several attempts to contact you at my request.  It is in my best judgment for your health and well-being that you contact my office for the following reason(s):    _x_ Schedule office visit for a Diabetes Follow Up     _x_ Schedule Annual Physical        ___      Other:        I believe that the relationship between a physician and their patient is one of communication and mutual cooperation. It is important for the patient to follow through with the recommendations made by their Doctor in order to achieve the best possible outcome. If there are any problems or concerns that I am not aware of , please call the office so that we may resolve any issues and proceed with your care.       Sincerely,    The office of Rosa Lopez MD

## (undated) NOTE — LETTER
03/07/22        Bridgette Welsh Nickel  [address] [place] [address]      Dear Brigitte Severin,    [address] records indicate that you have outstanding lab work and or testing that was ordered for you and has not yet been completed:  Orders Placed This Encounter      Comp Metabolic Panel (14)      Lipid Panel      CBC With Differential With Platelet      Hemoglobin A1C      Lipid Panel [E]      Comp Metabolic Panel (14) [E]      To provide you with the best possible care, please complete these orders at your earliest convenience. If you have recently completed these orders please disregard this letter. If you have any questions please call the office at Dept: [phone].      Thank you,       Renetta Blair PA-C

## (undated) NOTE — LETTER
09/09/21        Lorraine Gosselin Nickel  [address] [place] [address]      Dear Brenden Rey,    [address] [facility] records indicate that you have outstanding lab work and or testing that was ordered for you and has not yet been completed:  Orders Placed This Encounter

## (undated) NOTE — LETTER
07/22/19        Liam Allen  [address] [place] [address]      Dear Flakita Vences,    [facility] records indicate that you have outstanding FASTING lab work and or testing that was ordered for you and has not yet been completed:  Orders Placed This Enc

## (undated) NOTE — LETTER
08/22/19        Walter Crook Nickel  [address] [place] [address]      Dear Bety Kelley,    [address] [facility] records indicate that you have outstanding lab work and or testing that was ordered for you and has not yet been completed:  Orders Placed This Encounter

## (undated) NOTE — LETTER
08/22/19        Lester Kate Nickel  [address] [place] [address]      Dear Kvng De La Torre,    [address] [facility] records indicate that you have outstanding lab work and or testing that was ordered for you and has not yet been completed:  Orders Placed This Encounter